# Patient Record
Sex: MALE | Race: WHITE | NOT HISPANIC OR LATINO
[De-identification: names, ages, dates, MRNs, and addresses within clinical notes are randomized per-mention and may not be internally consistent; named-entity substitution may affect disease eponyms.]

---

## 2021-03-04 PROBLEM — Z00.00 ENCOUNTER FOR PREVENTIVE HEALTH EXAMINATION: Status: ACTIVE | Noted: 2021-03-04

## 2021-03-19 ENCOUNTER — APPOINTMENT (OUTPATIENT)
Dept: HEART AND VASCULAR | Facility: CLINIC | Age: 76
End: 2021-03-19
Payer: MEDICARE

## 2021-03-19 VITALS
HEART RATE: 56 BPM | HEIGHT: 76 IN | SYSTOLIC BLOOD PRESSURE: 223 MMHG | TEMPERATURE: 97.34 F | WEIGHT: 270 LBS | DIASTOLIC BLOOD PRESSURE: 101 MMHG | BODY MASS INDEX: 32.88 KG/M2 | OXYGEN SATURATION: 98 %

## 2021-03-19 VITALS
HEART RATE: 71 BPM | TEMPERATURE: 95.9 F | BODY MASS INDEX: 26.51 KG/M2 | HEIGHT: 73 IN | DIASTOLIC BLOOD PRESSURE: 124 MMHG | OXYGEN SATURATION: 97 % | WEIGHT: 200 LBS | SYSTOLIC BLOOD PRESSURE: 178 MMHG

## 2021-03-19 DIAGNOSIS — N52.9 MALE ERECTILE DYSFUNCTION, UNSPECIFIED: ICD-10-CM

## 2021-03-19 PROCEDURE — 99204 OFFICE O/P NEW MOD 45 MIN: CPT | Mod: 25

## 2021-03-19 PROCEDURE — 93000 ELECTROCARDIOGRAM COMPLETE: CPT

## 2021-04-19 PROBLEM — N52.9 ERECTILE DYSFUNCTION: Status: ACTIVE | Noted: 2021-04-19

## 2021-04-19 RX ORDER — SILDENAFIL 25 MG/1
25 TABLET ORAL
Qty: 10 | Refills: 0 | Status: ACTIVE | COMMUNITY
Start: 2021-04-19

## 2021-04-19 RX ORDER — RIVAROXABAN 20 MG/1
20 TABLET, FILM COATED ORAL
Qty: 90 | Refills: 0 | Status: ACTIVE | COMMUNITY
Start: 2021-04-19

## 2021-04-19 NOTE — CARDIOLOGY SUMMARY
[LVEF ___%] : LVEF [unfilled]% [___] : [unfilled] [Enlarged] : enlarged LA size [Moderate] : moderate mitral regurgitation

## 2021-04-19 NOTE — DISCUSSION/SUMMARY
[FreeTextEntry1] : Mr. Velásquez is a 75 year-old male with persistent atrial fibrillation with symptoms despite adequate rate control.  To determine if his symptoms are related to his AF, I have asked him to undergo a DC cardioversion and evaluate his WEAVER in normal rhythm.  If his symptoms improve, we will consider AAD or ablative therapy.  In the interim, he will remain on Toprol XL for rate control and Xarelto for stroke prophylaxis. He will be scheduled in the coming weeks.

## 2021-04-19 NOTE — HISTORY OF PRESENT ILLNESS
[FreeTextEntry1] : Mr. Velásquez is a 75 year-old male with hypertension, hyperlipidemia, and persistent atrial fibrillation who presents to [facility] care.  He was diagnosed with atrial fibrillation and has been managed with a rate control strategy employing low dose toprol.  Despite adequate rate control, he continues to reports dyspnea upon exertion, reporting SOB after 1/4 mile or a flight of stairs.  He denies palpitations, chest pain, SOB at rest, LE edema, orthopnea, PND, and syncope.  No previous attempts at rhythm control.  He has been using Xarelto for stroke prophylaxis and denies any bleeding issues.

## 2021-04-19 NOTE — PHYSICAL EXAM
[General Appearance - Well Developed] : well developed [Normal Appearance] : normal appearance [Well Groomed] : well groomed [General Appearance - Well Nourished] : well nourished [No Deformities] : no deformities [General Appearance - In No Acute Distress] : no acute distress [Normal Conjunctiva] : the conjunctiva exhibited no abnormalities [Eyelids - No Xanthelasma] : the eyelids demonstrated no xanthelasmas [Normal Oral Mucosa] : normal oral mucosa [No Oral Pallor] : no oral pallor [No Oral Cyanosis] : no oral cyanosis [Normal Jugular Venous A Waves Present] : normal jugular venous A waves present [Normal Jugular Venous V Waves Present] : normal jugular venous V waves present [No Jugular Venous Johnson A Waves] : no jugular venous johnson A waves [Heart Rate And Rhythm] : heart rate and rhythm were normal [Heart Sounds] : normal S1 and S2 [Murmurs] : no murmurs present [Respiration, Rhythm And Depth] : normal respiratory rhythm and effort [Exaggerated Use Of Accessory Muscles For Inspiration] : no accessory muscle use [Auscultation Breath Sounds / Voice Sounds] : lungs were clear to auscultation bilaterally [Abdomen Soft] : soft [Abdomen Tenderness] : non-tender [Abdomen Mass (___ Cm)] : no abdominal mass palpated [Abnormal Walk] : normal gait [Gait - Sufficient For Exercise Testing] : the gait was sufficient for exercise testing [Nail Clubbing] : no clubbing of the fingernails [Cyanosis, Localized] : no localized cyanosis [Petechial Hemorrhages (___cm)] : no petechial hemorrhages [] : no ischemic changes

## 2021-04-23 ENCOUNTER — APPOINTMENT (OUTPATIENT)
Dept: HEART AND VASCULAR | Facility: CLINIC | Age: 76
End: 2021-04-23

## 2021-04-23 ENCOUNTER — APPOINTMENT (OUTPATIENT)
Dept: HEART AND VASCULAR | Facility: CLINIC | Age: 76
End: 2021-04-23
Payer: MEDICARE

## 2021-04-23 VITALS
BODY MASS INDEX: 26.11 KG/M2 | TEMPERATURE: 96.98 F | SYSTOLIC BLOOD PRESSURE: 124 MMHG | HEART RATE: 88 BPM | WEIGHT: 197 LBS | OXYGEN SATURATION: 96 % | HEIGHT: 73 IN | DIASTOLIC BLOOD PRESSURE: 79 MMHG

## 2021-04-23 PROCEDURE — 99214 OFFICE O/P EST MOD 30 MIN: CPT

## 2021-04-30 ENCOUNTER — TRANSCRIPTION ENCOUNTER (OUTPATIENT)
Age: 76
End: 2021-04-30

## 2021-05-11 NOTE — PHYSICAL EXAM
[General Appearance - Well Developed] : well developed [Normal Appearance] : normal appearance [Well Groomed] : well groomed [General Appearance - Well Nourished] : well nourished [No Deformities] : no deformities [General Appearance - In No Acute Distress] : no acute distress [Normal Conjunctiva] : the conjunctiva exhibited no abnormalities [Eyelids - No Xanthelasma] : the eyelids demonstrated no xanthelasmas [Normal Oral Mucosa] : normal oral mucosa [No Oral Pallor] : no oral pallor [No Oral Cyanosis] : no oral cyanosis [Normal Jugular Venous A Waves Present] : normal jugular venous A waves present [Normal Jugular Venous V Waves Present] : normal jugular venous V waves present [No Jugular Venous Johnson A Waves] : no jugular venous johnson A waves [Heart Rate And Rhythm] : heart rate and rhythm were normal [Heart Sounds] : normal S1 and S2 [Murmurs] : no murmurs present [Respiration, Rhythm And Depth] : normal respiratory rhythm and effort [Exaggerated Use Of Accessory Muscles For Inspiration] : no accessory muscle use [Auscultation Breath Sounds / Voice Sounds] : lungs were clear to auscultation bilaterally [Abdomen Soft] : soft [Abdomen Tenderness] : non-tender [Abdomen Mass (___ Cm)] : no abdominal mass palpated [Gait - Sufficient For Exercise Testing] : the gait was sufficient for exercise testing [Abnormal Walk] : normal gait [Nail Clubbing] : no clubbing of the fingernails [Cyanosis, Localized] : no localized cyanosis [Petechial Hemorrhages (___cm)] : no petechial hemorrhages [] : no ischemic changes

## 2021-05-16 NOTE — DISCUSSION/SUMMARY
[FreeTextEntry1] : Mr. Velásquez is a 75 year-old male with persistent atrial fibrillation with symptoms despite adequate rate control.  To determine if his symptoms are related to his AF, I have asked him to undergo a DC cardioversion and evaluate his WEAVER in normal rhythm.  If his symptoms improve, we will consider AAD or ablative therapy.  In the interim, he will remain on Toprol XL for rate control and Xarelto for stroke prophylaxis. He will be scheduled several weeks after his upcoming urologic procedure.

## 2021-05-16 NOTE — HISTORY OF PRESENT ILLNESS
[FreeTextEntry1] : Mr. Velásquez is a 75 year-old male with hypertension, hyperlipidemia, and persistent atrial fibrillation who presents to [facility] care.  He was diagnosed with atrial fibrillation and has been managed with a rate control strategy employing low dose toprol.  Despite adequate rate control, he continues to reports dyspnea upon exertion, reporting SOB after 1/4 mile or a flight of stairs.  He denies palpitations, chest pain, SOB at rest, LE edema, orthopnea, PND, and syncope.  No previous attempts at rhythm control.  He has been using Xarelto for stroke prophylaxis and denies any bleeding issues.   \par \par We were planning on proceeding with DC cardioversion, but he now requires emergent prostate surgery due to urinary retention.

## 2021-06-18 ENCOUNTER — APPOINTMENT (OUTPATIENT)
Dept: HEART AND VASCULAR | Facility: CLINIC | Age: 76
End: 2021-06-18
Payer: MEDICARE

## 2021-06-18 ENCOUNTER — NON-APPOINTMENT (OUTPATIENT)
Age: 76
End: 2021-06-18

## 2021-06-18 VITALS
OXYGEN SATURATION: 97 % | HEIGHT: 73 IN | TEMPERATURE: 98 F | WEIGHT: 195 LBS | SYSTOLIC BLOOD PRESSURE: 180 MMHG | DIASTOLIC BLOOD PRESSURE: 100 MMHG | BODY MASS INDEX: 25.84 KG/M2 | HEART RATE: 57 BPM

## 2021-06-18 PROCEDURE — 99214 OFFICE O/P EST MOD 30 MIN: CPT | Mod: 25

## 2021-06-18 PROCEDURE — 93000 ELECTROCARDIOGRAM COMPLETE: CPT

## 2021-07-06 NOTE — DISCUSSION/SUMMARY
[FreeTextEntry1] : Mr. Velásquez is a 76 year-old male with persistent atrial fibrillation with symptoms despite adequate rate control.  His WEAVER improved following DC cardioversion.  As such, I have asked him to consider AAD or ablative therapy.  He will consider these options and contact me with questions.  In the interim, he will remain on Toprol XL for rate control and Xarelto for stroke prophylaxis.

## 2021-07-06 NOTE — HISTORY OF PRESENT ILLNESS
[FreeTextEntry1] : Mr. Velásquez is a 76 year-old male with hypertension, hyperlipidemia, and persistent atrial fibrillation who presents for follow-up.  He was diagnosed with atrial fibrillation and had been managed with a rate control strategy employing low dose toprol.  Despite adequate rate control, he continued to reports dyspnea upon exertion, reporting SOB after 1/4 mile or a flight of stairs.  He denied palpitations, chest pain, SOB at rest, LE edema, orthopnea, PND, and syncope.  Due to persistent symptoms despite adequate rate control, he underwent DC cardioversion in June 2021.  He a dramatic improvement in his symptoms since the cardioversion, with much less WEAVER.  He has been taking his Xarelto without issue.

## 2021-07-06 NOTE — CARDIOLOGY SUMMARY
[de-identified] : NSR at 61 bpm, normal axis/intervals, PAC [LVEF ___%] : LVEF [unfilled]% [___] : [unfilled] [Enlarged] : enlarged LA size [Moderate] : moderate mitral regurgitation

## 2021-07-06 NOTE — PHYSICAL EXAM
[General Appearance - Well Developed] : well developed [Normal Appearance] : normal appearance [Well Groomed] : well groomed [General Appearance - Well Nourished] : well nourished [No Deformities] : no deformities [General Appearance - In No Acute Distress] : no acute distress [Normal Conjunctiva] : the conjunctiva exhibited no abnormalities [Eyelids - No Xanthelasma] : the eyelids demonstrated no xanthelasmas [Normal Oral Mucosa] : normal oral mucosa [No Oral Pallor] : no oral pallor [No Oral Cyanosis] : no oral cyanosis [Normal Jugular Venous A Waves Present] : normal jugular venous A waves present [Normal Jugular Venous V Waves Present] : normal jugular venous V waves present [No Jugular Venous Johnson A Waves] : no jugular venous johnson A waves [Heart Rate And Rhythm] : heart rate and rhythm were normal [Heart Sounds] : normal S1 and S2 [Murmurs] : no murmurs present [Respiration, Rhythm And Depth] : normal respiratory rhythm and effort [Exaggerated Use Of Accessory Muscles For Inspiration] : no accessory muscle use [Auscultation Breath Sounds / Voice Sounds] : lungs were clear to auscultation bilaterally [Abdomen Soft] : soft [Abdomen Tenderness] : non-tender [Abnormal Walk] : normal gait [Abdomen Mass (___ Cm)] : no abdominal mass palpated [Gait - Sufficient For Exercise Testing] : the gait was sufficient for exercise testing [Nail Clubbing] : no clubbing of the fingernails [Cyanosis, Localized] : no localized cyanosis [Petechial Hemorrhages (___cm)] : no petechial hemorrhages [] : no ischemic changes

## 2021-08-02 ENCOUNTER — NON-APPOINTMENT (OUTPATIENT)
Age: 76
End: 2021-08-02

## 2021-08-03 ENCOUNTER — APPOINTMENT (OUTPATIENT)
Dept: SURGERY | Facility: CLINIC | Age: 76
End: 2021-08-03
Payer: MEDICARE

## 2021-08-03 ENCOUNTER — NON-APPOINTMENT (OUTPATIENT)
Age: 76
End: 2021-08-03

## 2021-08-03 DIAGNOSIS — K40.90 UNILATERAL INGUINAL HERNIA, W/OUT OBSTRUCTION OR GANGRENE, NOT SPECIFIED AS RECURRENT: ICD-10-CM

## 2021-08-03 PROCEDURE — 99204 OFFICE O/P NEW MOD 45 MIN: CPT

## 2021-08-03 NOTE — PHYSICAL EXAM
[Normal Breath Sounds] : Normal breath sounds [Normal Heart Sounds] : normal heart sounds [No Rash or Lesion] : No rash or lesion [Alert] : alert [Oriented to Person] : oriented to person [Oriented to Place] : oriented to place [Oriented to Time] : oriented to time [Calm] : calm [de-identified] : NAD [de-identified] : right inguinal hernia, reducible, no left inguinal hernia palpated

## 2021-08-03 NOTE — HISTORY OF PRESENT ILLNESS
[de-identified] : The patient presents for evaluation of a new finding of right groin pain and intermittent bulging. He first noted this a few weeks ago and it is becoming more noticeable and uncomfortable. He feels it as the day progresses. He has no \par GI issues but is s/p a recent TURP. He is on Xarelto for a fib.

## 2021-08-03 NOTE — PLAN
[FreeTextEntry1] : He will see Dr De Paz this week for medical clearance and will need to come off the Xarelto 3 days prior to the surgery (his cardiologist is Dr Caballero). \par He will tentatively schedule the surgery for Monday August 16th. He will need Covid testing prior to the surgery.

## 2021-08-03 NOTE — CONSULT LETTER
[Dear  ___] : Dear  [unfilled], [Consult Letter:] : I had the pleasure of evaluating your patient, [unfilled]. [Please see my note below.] : Please see my note below. [FreeTextEntry1] : Hakan Vargas\par \tamar Mario Didier is setting up his hernia repair for August 16th (robotic right, possible left). He is seeing you for medical clearance. He asked about an anxiolytic the night before due to his anxiety. I told him you would be the one to talk to about this. He also should come off his Xarelto (which he did prior to his TURP) without the need for bridging. Thanks!\tamar \tamar Angulo

## 2021-08-03 NOTE — ASSESSMENT
[FreeTextEntry1] : right inguinla hernia, reducible, symptomatic,recommended a  robotic assisted laparoscopic RIHR with mesh, possible left, possible open\par The risks benefits and alternatives were discussed and the patient agrees to the described plan.\par \par

## 2021-08-16 ENCOUNTER — APPOINTMENT (OUTPATIENT)
Dept: SURGERY | Facility: HOSPITAL | Age: 76
End: 2021-08-16

## 2021-09-07 ENCOUNTER — APPOINTMENT (OUTPATIENT)
Dept: SURGERY | Facility: CLINIC | Age: 76
End: 2021-09-07
Payer: MEDICARE

## 2021-09-07 VITALS
WEIGHT: 192 LBS | BODY MASS INDEX: 25.45 KG/M2 | HEIGHT: 73 IN | HEART RATE: 56 BPM | SYSTOLIC BLOOD PRESSURE: 136 MMHG | TEMPERATURE: 97.7 F | DIASTOLIC BLOOD PRESSURE: 81 MMHG

## 2021-09-07 DIAGNOSIS — Z09 ENCOUNTER FOR FOLLOW-UP EXAMINATION AFTER COMPLETED TREATMENT FOR CONDITIONS OTHER THAN MALIGNANT NEOPLASM: ICD-10-CM

## 2021-09-07 PROCEDURE — 99024 POSTOP FOLLOW-UP VISIT: CPT

## 2021-09-17 ENCOUNTER — NON-APPOINTMENT (OUTPATIENT)
Age: 76
End: 2021-09-17

## 2021-09-17 ENCOUNTER — APPOINTMENT (OUTPATIENT)
Dept: HEART AND VASCULAR | Facility: CLINIC | Age: 76
End: 2021-09-17
Payer: MEDICARE

## 2021-09-17 VITALS
WEIGHT: 190 LBS | SYSTOLIC BLOOD PRESSURE: 170 MMHG | HEART RATE: 55 BPM | OXYGEN SATURATION: 98 % | TEMPERATURE: 97.8 F | BODY MASS INDEX: 25.18 KG/M2 | DIASTOLIC BLOOD PRESSURE: 100 MMHG | HEIGHT: 73 IN

## 2021-09-17 VITALS
WEIGHT: 185 LBS | TEMPERATURE: 97.8 F | DIASTOLIC BLOOD PRESSURE: 80 MMHG | HEART RATE: 62 BPM | OXYGEN SATURATION: 98 % | SYSTOLIC BLOOD PRESSURE: 115 MMHG | BODY MASS INDEX: 24.52 KG/M2 | HEIGHT: 73 IN

## 2021-09-17 PROCEDURE — 99214 OFFICE O/P EST MOD 30 MIN: CPT | Mod: 25

## 2021-09-17 PROCEDURE — 93000 ELECTROCARDIOGRAM COMPLETE: CPT

## 2021-09-17 RX ORDER — FUROSEMIDE 20 MG/1
20 TABLET ORAL DAILY
Qty: 30 | Refills: 0 | Status: DISCONTINUED | COMMUNITY
Start: 2021-04-19 | End: 2021-09-17

## 2021-09-17 RX ORDER — ALFUZOSIN HYDROCHLORIDE 10 MG/1
10 TABLET, EXTENDED RELEASE ORAL DAILY
Qty: 30 | Refills: 0 | Status: DISCONTINUED | COMMUNITY
Start: 2021-04-19 | End: 2021-09-17

## 2021-09-17 RX ORDER — SIMVASTATIN 20 MG/1
20 TABLET, FILM COATED ORAL
Qty: 30 | Refills: 0 | Status: DISCONTINUED | COMMUNITY
Start: 2021-04-19 | End: 2021-09-17

## 2021-09-17 RX ORDER — METOPROLOL SUCCINATE 25 MG/1
25 TABLET, EXTENDED RELEASE ORAL
Qty: 30 | Refills: 2 | Status: DISCONTINUED | COMMUNITY
Start: 2021-04-19 | End: 2021-09-17

## 2021-09-19 NOTE — PHYSICAL EXAM
[General Appearance - Well Developed] : well developed [Well Groomed] : well groomed [Normal Appearance] : normal appearance [General Appearance - Well Nourished] : well nourished [No Deformities] : no deformities [General Appearance - In No Acute Distress] : no acute distress [Normal Conjunctiva] : the conjunctiva exhibited no abnormalities [Eyelids - No Xanthelasma] : the eyelids demonstrated no xanthelasmas [Normal Oral Mucosa] : normal oral mucosa [No Oral Pallor] : no oral pallor [No Oral Cyanosis] : no oral cyanosis [Normal Jugular Venous A Waves Present] : normal jugular venous A waves present [Normal Jugular Venous V Waves Present] : normal jugular venous V waves present [No Jugular Venous Johnson A Waves] : no jugular venous johnson A waves [Heart Rate And Rhythm] : heart rate and rhythm were normal [Heart Sounds] : normal S1 and S2 [Murmurs] : no murmurs present [Respiration, Rhythm And Depth] : normal respiratory rhythm and effort [Exaggerated Use Of Accessory Muscles For Inspiration] : no accessory muscle use [Auscultation Breath Sounds / Voice Sounds] : lungs were clear to auscultation bilaterally [Abdomen Tenderness] : non-tender [Abdomen Soft] : soft [Abdomen Mass (___ Cm)] : no abdominal mass palpated [Abnormal Walk] : normal gait [Gait - Sufficient For Exercise Testing] : the gait was sufficient for exercise testing [Nail Clubbing] : no clubbing of the fingernails [Cyanosis, Localized] : no localized cyanosis [Petechial Hemorrhages (___cm)] : no petechial hemorrhages [] : no ischemic changes

## 2021-09-19 NOTE — CARDIOLOGY SUMMARY
[de-identified] : NSR at 62 bpm, normal axis/intervals, PAC [LVEF ___%] : LVEF [unfilled]% [___] : [unfilled] [Enlarged] : enlarged LA size [Moderate] : moderate mitral regurgitation

## 2021-12-17 ENCOUNTER — APPOINTMENT (OUTPATIENT)
Dept: HEART AND VASCULAR | Facility: CLINIC | Age: 76
End: 2021-12-17
Payer: MEDICARE

## 2021-12-17 ENCOUNTER — NON-APPOINTMENT (OUTPATIENT)
Age: 76
End: 2021-12-17

## 2021-12-17 VITALS
HEIGHT: 73 IN | WEIGHT: 195 LBS | SYSTOLIC BLOOD PRESSURE: 110 MMHG | HEART RATE: 65 BPM | BODY MASS INDEX: 25.84 KG/M2 | TEMPERATURE: 97.8 F | DIASTOLIC BLOOD PRESSURE: 80 MMHG | OXYGEN SATURATION: 97 %

## 2021-12-17 PROCEDURE — 99214 OFFICE O/P EST MOD 30 MIN: CPT | Mod: 25

## 2021-12-17 PROCEDURE — 93000 ELECTROCARDIOGRAM COMPLETE: CPT

## 2022-01-03 NOTE — CARDIOLOGY SUMMARY
[de-identified] : NSR at 58 bpm, normal axis/intervals [LVEF ___%] : LVEF [unfilled]% [___] : [unfilled] [Enlarged] : enlarged LA size [Moderate] : moderate mitral regurgitation

## 2022-06-24 ENCOUNTER — NON-APPOINTMENT (OUTPATIENT)
Age: 77
End: 2022-06-24

## 2022-06-24 ENCOUNTER — APPOINTMENT (OUTPATIENT)
Dept: HEART AND VASCULAR | Facility: CLINIC | Age: 77
End: 2022-06-24

## 2022-06-24 VITALS
DIASTOLIC BLOOD PRESSURE: 70 MMHG | HEART RATE: 77 BPM | OXYGEN SATURATION: 97 % | HEIGHT: 73 IN | TEMPERATURE: 97.8 F | SYSTOLIC BLOOD PRESSURE: 110 MMHG | WEIGHT: 199 LBS | BODY MASS INDEX: 26.37 KG/M2

## 2022-06-24 PROCEDURE — 99213 OFFICE O/P EST LOW 20 MIN: CPT | Mod: 25

## 2022-06-24 PROCEDURE — 93000 ELECTROCARDIOGRAM COMPLETE: CPT

## 2022-07-06 NOTE — DISCUSSION/SUMMARY
[FreeTextEntry1] : Mr. Velásquez is a 77 year-old male with persistent atrial fibrillation with symptoms despite adequate rate control.  His WEAVER improved following DC cardioversion.  As such, I have asked him to consider AAD or ablative therapy.  He will consider these options and contact me with questions, but would like to wait until he has a recurrence.  In the interim, he will remain on Toprol XL for rate control and Xarelto for stroke prophylaxis.

## 2022-07-06 NOTE — HISTORY OF PRESENT ILLNESS
[FreeTextEntry1] : Mr. Velásquez is a 77 year-old male with hypertension, hyperlipidemia, and persistent atrial fibrillation who presents for follow-up.  He was diagnosed with atrial fibrillation and had been managed with a rate control strategy employing low dose toprol.  Despite adequate rate control, he continued to reports dyspnea upon exertion, reporting SOB after 1/4 mile or a flight of stairs.  He denied palpitations, chest pain, SOB at rest, LE edema, orthopnea, PND, and syncope.  Due to persistent symptoms despite adequate rate control, he underwent DC cardioversion in June 2021.  He a dramatic improvement in his symptoms since the cardioversion, with much less WEAVER.  He has been taking his Xarelto without issue.

## 2022-07-06 NOTE — CARDIOLOGY SUMMARY
[de-identified] : NSR at 57 bpm, normal axis/intervals [LVEF ___%] : LVEF [unfilled]% [___] : [unfilled] [Enlarged] : enlarged LA size [Moderate] : moderate mitral regurgitation

## 2022-09-01 ENCOUNTER — NON-APPOINTMENT (OUTPATIENT)
Age: 77
End: 2022-09-01

## 2022-09-16 ENCOUNTER — APPOINTMENT (OUTPATIENT)
Dept: HEART AND VASCULAR | Facility: CLINIC | Age: 77
End: 2022-09-16

## 2022-09-16 ENCOUNTER — NON-APPOINTMENT (OUTPATIENT)
Age: 77
End: 2022-09-16

## 2022-09-16 VITALS
DIASTOLIC BLOOD PRESSURE: 90 MMHG | SYSTOLIC BLOOD PRESSURE: 135 MMHG | HEIGHT: 73 IN | TEMPERATURE: 98.7 F | HEART RATE: 77 BPM | WEIGHT: 203 LBS | BODY MASS INDEX: 26.9 KG/M2 | OXYGEN SATURATION: 98 %

## 2022-09-16 PROCEDURE — 93000 ELECTROCARDIOGRAM COMPLETE: CPT

## 2022-09-16 PROCEDURE — 99214 OFFICE O/P EST MOD 30 MIN: CPT | Mod: 25

## 2022-09-17 ENCOUNTER — RESULT REVIEW (OUTPATIENT)
Age: 77
End: 2022-09-17

## 2022-10-06 NOTE — CARDIOLOGY SUMMARY
[de-identified] : AF at 79 bpm, normal axis/intervals [LVEF ___%] : LVEF [unfilled]% [___] : [unfilled] [Enlarged] : enlarged LA size [Moderate] : moderate mitral regurgitation

## 2022-10-06 NOTE — HISTORY OF PRESENT ILLNESS
[FreeTextEntry1] : Mr. Velásquez is a 77 year-old male with hypertension, hyperlipidemia, and persistent atrial fibrillation who presents for follow-up.  He was diagnosed with atrial fibrillation and had been managed with a rate control strategy employing low dose toprol.  Despite adequate rate control, he continued to reports dyspnea upon exertion, reporting SOB after 1/4 mile or a flight of stairs.  He denied palpitations, chest pain, SOB at rest, LE edema, orthopnea, PND, and syncope.  Due to persistent symptoms despite adequate rate control, he underwent DC cardioversion in June 2021.  He reported a dramatic improvement in his symptoms since the cardioversion, with much less WEAVER.  He unfortunately reverted back to AF in August with similar complaints of WEAVER.

## 2022-10-06 NOTE — PHYSICAL EXAM
[Well Developed] : well developed [Well Nourished] : well nourished [No Acute Distress] : no acute distress [Normal Venous Pressure] : normal venous pressure [No Carotid Bruit] : no carotid bruit [Normal S1, S2] : normal S1, S2 [No Murmur] : no murmur [No Rub] : no rub [No Gallop] : no gallop [Clear Lung Fields] : clear lung fields [Good Air Entry] : good air entry [No Respiratory Distress] : no respiratory distress  [Soft] : abdomen soft [Non Tender] : non-tender [No Masses/organomegaly] : no masses/organomegaly [Normal Bowel Sounds] : normal bowel sounds [Normal Gait] : normal gait [No Edema] : no edema [No Cyanosis] : no cyanosis [No Clubbing] : no clubbing [No Varicosities] : no varicosities [No Rash] : no rash [No Skin Lesions] : no skin lesions [Moves all extremities] : moves all extremities [No Focal Deficits] : no focal deficits [Normal Speech] : normal speech [Alert and Oriented] : alert and oriented [Normal memory] : normal memory [General Appearance - Well Developed] : well developed [Normal Appearance] : normal appearance [Well Groomed] : well groomed [General Appearance - Well Nourished] : well nourished [No Deformities] : no deformities [General Appearance - In No Acute Distress] : no acute distress [Normal Conjunctiva] : the conjunctiva exhibited no abnormalities [Eyelids - No Xanthelasma] : the eyelids demonstrated no xanthelasmas [Normal Oral Mucosa] : normal oral mucosa [No Oral Pallor] : no oral pallor [No Oral Cyanosis] : no oral cyanosis [Normal Jugular Venous A Waves Present] : normal jugular venous A waves present [Normal Jugular Venous V Waves Present] : normal jugular venous V waves present [No Jugular Venous Johnson A Waves] : no jugular venous johnson A waves [Heart Rate And Rhythm] : heart rate and rhythm were normal [Heart Sounds] : normal S1 and S2 [Murmurs] : no murmurs present [Respiration, Rhythm And Depth] : normal respiratory rhythm and effort [Exaggerated Use Of Accessory Muscles For Inspiration] : no accessory muscle use [Auscultation Breath Sounds / Voice Sounds] : lungs were clear to auscultation bilaterally [Abdomen Soft] : soft [Abdomen Tenderness] : non-tender [Abdomen Mass (___ Cm)] : no abdominal mass palpated [Abnormal Walk] : normal gait [Gait - Sufficient For Exercise Testing] : the gait was sufficient for exercise testing [Nail Clubbing] : no clubbing of the fingernails [Cyanosis, Localized] : no localized cyanosis [Petechial Hemorrhages (___cm)] : no petechial hemorrhages [] : no ischemic changes

## 2022-10-06 NOTE — DISCUSSION/SUMMARY
[FreeTextEntry1] : Mr. Velásquez is a 77 year-old male with persistent atrial fibrillation with symptoms despite adequate rate control.  His WEAVER improved following DC cardioversion but he has now reverted back to AF.   As such, I have asked him to consider DC cardioversion followed by ablation.  We discussed the risks of the procedure, which included but are not limited to, bleeding, infection, vascular damage, tamponade, damaged to esophagus/GI tract, phrenic nerve injury,  and stroke.  He would like to proceed and will be scheduled for DCCV in the coming week, followed by ablation in October of 2022.  He will maintain Xarelto without interruption.   [EKG obtained to assist in diagnosis and management of assessed problem(s)] : EKG obtained to assist in diagnosis and management of assessed problem(s)

## 2022-10-29 ENCOUNTER — RESULT REVIEW (OUTPATIENT)
Age: 77
End: 2022-10-29

## 2022-11-02 ENCOUNTER — OUTPATIENT (OUTPATIENT)
Dept: OUTPATIENT SERVICES | Facility: HOSPITAL | Age: 77
LOS: 1 days | End: 2022-11-02

## 2022-11-02 ENCOUNTER — INPATIENT (INPATIENT)
Facility: HOSPITAL | Age: 77
LOS: 0 days | Discharge: HOME | DRG: 274 | End: 2022-11-03
Attending: INTERNAL MEDICINE | Admitting: INTERNAL MEDICINE
Payer: COMMERCIAL

## 2022-11-02 ENCOUNTER — APPOINTMENT (OUTPATIENT)
Dept: CT IMAGING | Facility: HOSPITAL | Age: 77
End: 2022-11-02

## 2022-11-02 VITALS
HEIGHT: 73 IN | OXYGEN SATURATION: 93 % | DIASTOLIC BLOOD PRESSURE: 80 MMHG | HEART RATE: 69 BPM | RESPIRATION RATE: 16 BRPM | WEIGHT: 205.03 LBS | SYSTOLIC BLOOD PRESSURE: 123 MMHG

## 2022-11-02 DIAGNOSIS — I48.19 OTHER PERSISTENT ATRIAL FIBRILLATION: ICD-10-CM

## 2022-11-02 LAB
ALBUMIN SERPL ELPH-MCNC: 4.2 G/DL — SIGNIFICANT CHANGE UP (ref 3.3–5)
ALP SERPL-CCNC: 66 U/L — SIGNIFICANT CHANGE UP (ref 40–120)
ALT FLD-CCNC: 18 U/L — SIGNIFICANT CHANGE UP (ref 10–45)
ANION GAP SERPL CALC-SCNC: 8 MMOL/L — SIGNIFICANT CHANGE UP (ref 5–17)
APTT BLD: 41.2 SEC — HIGH (ref 27.5–35.5)
AST SERPL-CCNC: 21 U/L — SIGNIFICANT CHANGE UP (ref 10–40)
BASE EXCESS BLDV CALC-SCNC: 5.6 MMOL/L — HIGH (ref -2–3)
BILIRUB SERPL-MCNC: 1.2 MG/DL — SIGNIFICANT CHANGE UP (ref 0.2–1.2)
BLD GP AB SCN SERPL QL: NEGATIVE — SIGNIFICANT CHANGE UP
BUN SERPL-MCNC: 24 MG/DL — HIGH (ref 7–23)
CA-I SERPL-SCNC: 1.16 MMOL/L — SIGNIFICANT CHANGE UP (ref 1.15–1.33)
CALCIUM SERPL-MCNC: 9 MG/DL — SIGNIFICANT CHANGE UP (ref 8.4–10.5)
CHLORIDE SERPL-SCNC: 98 MMOL/L — SIGNIFICANT CHANGE UP (ref 96–108)
CO2 BLDV-SCNC: 34.2 MMOL/L — HIGH (ref 22–26)
CO2 SERPL-SCNC: 31 MMOL/L — SIGNIFICANT CHANGE UP (ref 22–31)
COHGB MFR BLDV: 0.8 % — SIGNIFICANT CHANGE UP
CREAT SERPL-MCNC: 1.06 MG/DL — SIGNIFICANT CHANGE UP (ref 0.5–1.3)
EGFR: 73 ML/MIN/1.73M2 — SIGNIFICANT CHANGE UP
GAS PNL BLDV: 136 MMOL/L — SIGNIFICANT CHANGE UP (ref 136–145)
GLUCOSE BLDC GLUCOMTR-MCNC: 204 MG/DL — HIGH (ref 70–99)
GLUCOSE BLDV-MCNC: 258 MG/DL — HIGH (ref 70–99)
GLUCOSE SERPL-MCNC: 258 MG/DL — HIGH (ref 70–99)
HCO3 BLDV-SCNC: 32 MMOL/L — HIGH (ref 22–29)
HCT VFR BLD CALC: 43.5 % — SIGNIFICANT CHANGE UP (ref 39–50)
HGB BLD CALC-MCNC: 14.9 G/DL — SIGNIFICANT CHANGE UP (ref 12.6–17.4)
HGB BLD-MCNC: 14.9 G/DL — SIGNIFICANT CHANGE UP (ref 13–17)
INR BLD: 2.01 — HIGH (ref 0.88–1.16)
ISTAT INR: 2.1 — HIGH (ref 0.88–1.16)
ISTAT PT: 23.8 SEC — HIGH (ref 10–12.9)
MCHC RBC-ENTMCNC: 31 PG — SIGNIFICANT CHANGE UP (ref 27–34)
MCHC RBC-ENTMCNC: 34.3 GM/DL — SIGNIFICANT CHANGE UP (ref 32–36)
MCV RBC AUTO: 90.4 FL — SIGNIFICANT CHANGE UP (ref 80–100)
METHGB MFR BLDV: 0.1 % — SIGNIFICANT CHANGE UP
NRBC # BLD: 0 /100 WBCS — SIGNIFICANT CHANGE UP (ref 0–0)
PCO2 BLDV: 55 MMHG — SIGNIFICANT CHANGE UP (ref 42–55)
PH BLDV: 7.38 — SIGNIFICANT CHANGE UP (ref 7.32–7.43)
PLATELET # BLD AUTO: 141 K/UL — LOW (ref 150–400)
PO2 BLDV: <33 MMHG — LOW (ref 25–45)
POCT ISTAT CREATININE: 1 MG/DL — SIGNIFICANT CHANGE UP (ref 0.5–1.3)
POCT ISTAT CREATININE: 1.1 MG/DL — SIGNIFICANT CHANGE UP (ref 0.5–1.3)
POTASSIUM BLDV-SCNC: 4.2 MMOL/L — SIGNIFICANT CHANGE UP (ref 3.5–5.1)
POTASSIUM SERPL-MCNC: 4 MMOL/L — SIGNIFICANT CHANGE UP (ref 3.5–5.3)
POTASSIUM SERPL-SCNC: 4 MMOL/L — SIGNIFICANT CHANGE UP (ref 3.5–5.3)
PROT SERPL-MCNC: 7.1 G/DL — SIGNIFICANT CHANGE UP (ref 6–8.3)
PROTHROM AB SERPL-ACNC: 24.1 SEC — HIGH (ref 10.5–13.4)
RBC # BLD: 4.81 M/UL — SIGNIFICANT CHANGE UP (ref 4.2–5.8)
RBC # FLD: 12.7 % — SIGNIFICANT CHANGE UP (ref 10.3–14.5)
RH IG SCN BLD-IMP: POSITIVE — SIGNIFICANT CHANGE UP
SAO2 % BLDV: 32 % — LOW (ref 67–88)
SODIUM SERPL-SCNC: 137 MMOL/L — SIGNIFICANT CHANGE UP (ref 135–145)
WBC # BLD: 5 K/UL — SIGNIFICANT CHANGE UP (ref 3.8–10.5)
WBC # FLD AUTO: 5 K/UL — SIGNIFICANT CHANGE UP (ref 3.8–10.5)

## 2022-11-02 PROCEDURE — 93656 COMPRE EP EVAL ABLTJ ATR FIB: CPT

## 2022-11-02 PROCEDURE — 93010 ELECTROCARDIOGRAM REPORT: CPT

## 2022-11-02 PROCEDURE — 75574 CT ANGIO HRT W/3D IMAGE: CPT | Mod: 26,MH

## 2022-11-02 RX ORDER — RIVAROXABAN 15 MG-20MG
1 KIT ORAL
Qty: 0 | Refills: 0 | DISCHARGE

## 2022-11-02 RX ORDER — AMLODIPINE BESYLATE 2.5 MG/1
10 TABLET ORAL DAILY
Refills: 0 | Status: DISCONTINUED | OUTPATIENT
Start: 2022-11-02 | End: 2022-11-03

## 2022-11-02 RX ORDER — SIMVASTATIN 20 MG/1
1 TABLET, FILM COATED ORAL
Qty: 0 | Refills: 0 | DISCHARGE

## 2022-11-02 RX ORDER — RIVAROXABAN 15 MG-20MG
20 KIT ORAL
Refills: 0 | Status: DISCONTINUED | OUTPATIENT
Start: 2022-11-02 | End: 2022-11-03

## 2022-11-02 RX ORDER — LISINOPRIL 2.5 MG/1
40 TABLET ORAL DAILY
Refills: 0 | Status: DISCONTINUED | OUTPATIENT
Start: 2022-11-02 | End: 2022-11-03

## 2022-11-02 RX ORDER — RAMIPRIL 5 MG
1 CAPSULE ORAL
Qty: 0 | Refills: 0 | DISCHARGE

## 2022-11-02 RX ORDER — SIMVASTATIN 20 MG/1
20 TABLET, FILM COATED ORAL AT BEDTIME
Refills: 0 | Status: DISCONTINUED | OUTPATIENT
Start: 2022-11-02 | End: 2022-11-03

## 2022-11-02 RX ORDER — AMLODIPINE BESYLATE 2.5 MG/1
1 TABLET ORAL
Qty: 0 | Refills: 0 | DISCHARGE

## 2022-11-02 RX ADMIN — RIVAROXABAN 20 MILLIGRAM(S): KIT at 22:26

## 2022-11-02 RX ADMIN — SIMVASTATIN 20 MILLIGRAM(S): 20 TABLET, FILM COATED ORAL at 22:26

## 2022-11-02 RX ADMIN — AMLODIPINE BESYLATE 10 MILLIGRAM(S): 2.5 TABLET ORAL at 19:12

## 2022-11-02 NOTE — H&P ADULT - NSHPLABSRESULTS_GEN_ALL_CORE
14.9   5.00  )-----------( 141      ( 02 Nov 2022 11:55 )             43.5     11-02    137  |  98  |  24<H>  ----------------------------<  258<H>  4.0   |  31  |  1.06    Ca    9.0      02 Nov 2022 11:55    TPro  7.1  /  Alb  4.2  /  TBili  1.2  /  DBili  x   /  AST  21  /  ALT  18  /  AlkPhos  66  11-02    PT/INR - ( 02 Nov 2022 11:55 )   PT: 24.1 sec;   INR: 2.01       PTT - ( 02 Nov 2022 11:55 )  PTT:41.2 sec

## 2022-11-02 NOTE — H&P ADULT - NSHPPHYSICALEXAM_GEN_ALL_CORE
Constitutional: No acute Distress  Psych: Normal affect, normal mood  Neuro: A/o x 3, No focal deficits  Neck: Supple, NO JVD  CVS: irregular S1 S2, No M/R/G  Pulmonary: CTAB, Breathing unlabored, No Rhonchi/Rales/Wheezing  GI: Soft, Non -tender, +BS x 4 quads  Skin: No rash, warm and dry, no erythematous areas   MSK: 5/5 strength, normal range of motion, no swollen or erythematous joints.

## 2022-11-02 NOTE — CHART NOTE - NSCHARTNOTEFT_GEN_A_CORE
EPS BRIEF OP NOTE    DEV MURCIA  [id number]    PROCEDURE:  - AF ablation via cryoablation    INDICATION:  - persistent AF    ELECTROPHYSIOLOGIST(S):  - Dr. Lee (Attending)  - Dr. Crouch (Fellow)    ANESTHESIOLOGY:  - General  - Local    FINDINGS:  - US guided access of the bilateral groins. 11Fr in RFV. 10Fr + 7Fr in LFV  - ICE catheter revealed no effusion at start or end of case  - successful isolation of all four pulmonary veins via cryoablation  - Vascade MVP used for hemostasis    COMPLICATIONS:  - none    RECOMMENDATIONS:  - admit for overnight monitoring  - resume anticoagulation tonight  - continue home meds    Please refer to the full report to follow in [facility] for a detailed description of this case.    --  Sammy Crouch MD  Electrophysiology PGY7

## 2022-11-02 NOTE — CHART NOTE - NSCHARTNOTESELECT_GEN_ALL_CORE
Brief Op Note/Event Note Rhomboid Transposition Flap Text: The defect edges were debeveled with a #15 scalpel blade.  Given the location of the defect and the proximity to free margins a rhomboid transposition flap was deemed most appropriate.  Using a sterile surgical marker, an appropriate rhomboid flap was drawn incorporating the defect.    The area thus outlined was incised deep to adipose tissue with a #15 scalpel blade.  The skin margins were undermined to an appropriate distance in all directions utilizing iris scissors.

## 2022-11-02 NOTE — H&P ADULT - HISTORY OF PRESENT ILLNESS
Mr. Velásquez is a 77 year-old male with hypertension, hyperlipidemia, and persistent atrial fibrillation s/p DCCVx2 who presents for PVI ablation.     The patient was diagnosed with atrial fibrillation and had been managed with a rate control strategy employing low dose toprol. Despite adequate rate control, he continued to reports dyspnea upon exertion, reporting SOB after 1/4 mile or a flight of stairs. He denied palpitations, chest pain, SOB at rest, LE edema, orthopnea, PND, and syncope. Due to persistent symptoms despite adequate rate control, he underwent DC cardioversion in June 2021. He reported a dramatic improvement in his symptoms since the cardioversion, with much less WEAVER. He unfortunately reverted back to AF in August with similar complaints of WEAVER. He underwent a second cardioversion with reversion to AF shortly after. He presents today for AF ablation.     He presents today in AF. He has not missed any doses of Xarelto in the past month.

## 2022-11-02 NOTE — PRE-ANESTHESIA EVALUATION ADULT - NSANTHPMHFT_GEN_ALL_CORE
PMHx A-Fib (had successful DC CV last year but eventually reverted back to AFib)            HTN, HLD, Borderline DM, BPH, diverticulosis    PSxHx Hernia Repair, Arthroscopy knee, TURP- all GA no complication

## 2022-11-02 NOTE — H&P ADULT - ASSESSMENT
Mr. Velásquez is a 77 year-old male with hypertension, hyperlipidemia, and persistent atrial fibrillation s/p DCCVx2 who presents for PVI ablation.

## 2022-11-02 NOTE — PATIENT PROFILE ADULT - FALL HARM RISK - UNIVERSAL INTERVENTIONS
Bed in lowest position, wheels locked, appropriate side rails in place/Call bell, personal items and telephone in reach/Instruct patient to call for assistance before getting out of bed or chair/Non-slip footwear when patient is out of bed/[place] to call system/Physically safe environment - no spills, clutter or unnecessary equipment/Purposeful Proactive Rounding/Room/bathroom lighting operational, light cord in reach

## 2022-11-03 ENCOUNTER — TRANSCRIPTION ENCOUNTER (OUTPATIENT)
Age: 77
End: 2022-11-03

## 2022-11-03 VITALS
DIASTOLIC BLOOD PRESSURE: 97 MMHG | OXYGEN SATURATION: 100 % | SYSTOLIC BLOOD PRESSURE: 152 MMHG | HEART RATE: 86 BPM | RESPIRATION RATE: 18 BRPM

## 2022-11-03 LAB
HCV AB S/CO SERPL IA: 0.05 S/CO — SIGNIFICANT CHANGE UP
HCV AB SERPL-IMP: SIGNIFICANT CHANGE UP

## 2022-11-03 PROCEDURE — 93623 PRGRMD STIMJ&PACG IV RX NFS: CPT

## 2022-11-03 PROCEDURE — 86803 HEPATITIS C AB TEST: CPT

## 2022-11-03 PROCEDURE — 75574 CT ANGIO HRT W/3D IMAGE: CPT

## 2022-11-03 PROCEDURE — 85730 THROMBOPLASTIN TIME PARTIAL: CPT

## 2022-11-03 PROCEDURE — 82962 GLUCOSE BLOOD TEST: CPT

## 2022-11-03 PROCEDURE — 85610 PROTHROMBIN TIME: CPT

## 2022-11-03 PROCEDURE — 86850 RBC ANTIBODY SCREEN: CPT

## 2022-11-03 PROCEDURE — 93662 INTRACARDIAC ECG (ICE): CPT

## 2022-11-03 PROCEDURE — C1733: CPT

## 2022-11-03 PROCEDURE — 80053 COMPREHEN METABOLIC PANEL: CPT

## 2022-11-03 PROCEDURE — 93306 TTE W/DOPPLER COMPLETE: CPT

## 2022-11-03 PROCEDURE — C1760: CPT

## 2022-11-03 PROCEDURE — C1769: CPT

## 2022-11-03 PROCEDURE — 93656 COMPRE EP EVAL ABLTJ ATR FIB: CPT

## 2022-11-03 PROCEDURE — C1766: CPT

## 2022-11-03 PROCEDURE — 85347 COAGULATION TIME ACTIVATED: CPT

## 2022-11-03 PROCEDURE — 85027 COMPLETE CBC AUTOMATED: CPT

## 2022-11-03 PROCEDURE — C1759: CPT

## 2022-11-03 PROCEDURE — 82565 ASSAY OF CREATININE: CPT

## 2022-11-03 PROCEDURE — 82803 BLOOD GASES ANY COMBINATION: CPT

## 2022-11-03 PROCEDURE — 86901 BLOOD TYPING SEROLOGIC RH(D): CPT

## 2022-11-03 PROCEDURE — 93005 ELECTROCARDIOGRAM TRACING: CPT

## 2022-11-03 PROCEDURE — C1894: CPT

## 2022-11-03 PROCEDURE — C1730: CPT

## 2022-11-03 PROCEDURE — 86900 BLOOD TYPING SEROLOGIC ABO: CPT

## 2022-11-03 PROCEDURE — 36415 COLL VENOUS BLD VENIPUNCTURE: CPT

## 2022-11-03 PROCEDURE — C1893: CPT

## 2022-11-03 PROCEDURE — 93306 TTE W/DOPPLER COMPLETE: CPT | Mod: 26

## 2022-11-03 PROCEDURE — 93613 INTRACARDIAC EPHYS 3D MAPG: CPT

## 2022-11-03 RX ORDER — PANTOPRAZOLE SODIUM 20 MG/1
1 TABLET, DELAYED RELEASE ORAL
Qty: 30 | Refills: 0
Start: 2022-11-03 | End: 2022-12-02

## 2022-11-03 RX ADMIN — LISINOPRIL 40 MILLIGRAM(S): 2.5 TABLET ORAL at 06:19

## 2022-11-03 RX ADMIN — AMLODIPINE BESYLATE 10 MILLIGRAM(S): 2.5 TABLET ORAL at 06:19

## 2022-11-03 NOTE — DISCHARGE NOTE PROVIDER - CARE PROVIDER_API CALL
Gene Lee)  Cardiac Electrophysiology; Cardiovascular Disease; Internal Medicine  [address] [place] [address]  Phone: [phone]  Fax: [phone]  Follow Up Time: 1 month

## 2022-11-03 NOTE — DISCHARGE NOTE PROVIDER - HOSPITAL COURSE
Mr. Velásquez is a 77 year-old male with hypertension, hyperlipidemia, and persistent atrial fibrillation s/p DCCVx2 who presented for PVI ablation.  The procedure was successful and uncomplicated. He is maintaining sinus rhythm with PVCs post-procedure. His groin access sites are healing well with no bleeding or hematoma. He feels well, denies c/p, sob, lightheadedness, LE edema and syncope.   Mr. Velásquez is a 77 year-old male with hypertension, hyperlipidemia, and persistent atrial fibrillation s/p DCCVx2 who presented for PVI ablation.  The procedure was successful and uncomplicated. He is maintaining sinus rhythm with PVCs post-procedure. His groin access sites are healing well with no bleeding or hematoma. He feels well, denies c/p, sob, lightheadedness, LE edema and syncope.    The patient had a repeat echo now in sinus rhythm. His systolic function is preserved, and his MR is read as mild (previously read as moderate). He was also found to have pulmonary hypertension (PASP 41mmHg), and mild LVH. Results communicated with the patient.

## 2022-11-03 NOTE — DISCHARGE NOTE NURSING/CASE MANAGEMENT/SOCIAL WORK - NSDCPEFALRISK_GEN_ALL_CORE
For information on Fall & Injury Prevention, visit: [email or web address] OR  [email or web address] OR  https://www.cdc.gov/steadi/patient.html

## 2022-11-03 NOTE — DISCHARGE NOTE PROVIDER - NSDCCPCAREPLAN_GEN_ALL_CORE_FT
PRINCIPAL DISCHARGE DIAGNOSIS  Diagnosis: Persistent atrial fibrillation  Assessment and Plan of Treatment: You had an ablation of your atrial fibrillation substrate. To do this, catheters were placed into the veins of your groins. Please allow one week for this area to fully heal. Avoid lifting more than 5lbs, strenuous activity, straining and squatting. Continue taking Xarelto without any missed doses as this will prevent a stroke.  Continue all of your other medications.   You have been started on one new medicine called pantoprazole. Please take this for one month. It is to prevent acid reflux from irritating the esophagus post-procedure.  Please make a follow-up appointment for early December [facility] Dr. Lee at the [place] office.

## 2022-11-03 NOTE — DISCHARGE NOTE PROVIDER - NSDCFUSCHEDAPPT_GEN_ALL_CORE_FT
Gene Lee  [facility] Physician [facility]  [facility] [address]  Scheduled Appointment: 11/18/2022

## 2022-11-03 NOTE — DISCHARGE NOTE PROVIDER - NSDCMRMEDTOKEN_GEN_ALL_CORE_FT
amLODIPine 10 mg oral tablet: 1 tab(s) orally once a day  pantoprazole 40 mg oral delayed release tablet: 1 tab(s) orally once a day   ramipril 10 mg oral capsule: 1 cap(s) orally once a day  simvastatin 20 mg oral tablet: 1 tab(s) orally once a day (at bedtime)  Xarelto 20 mg oral tablet: 1 tab(s) orally once a day (in the evening)

## 2022-11-04 PROBLEM — E78.5 HYPERLIPIDEMIA, UNSPECIFIED: Chronic | Status: ACTIVE | Noted: 2022-11-02

## 2022-11-04 PROBLEM — I48.19 OTHER PERSISTENT ATRIAL FIBRILLATION: Chronic | Status: ACTIVE | Noted: 2022-11-02

## 2022-11-04 PROBLEM — I10 ESSENTIAL (PRIMARY) HYPERTENSION: Chronic | Status: ACTIVE | Noted: 2022-11-02

## 2022-11-15 LAB
ISTAT ACTK (ACTIVATED CLOTTING TIME KAOLIN): 347 SEC — HIGH (ref 74–137)
ISTAT ACTK (ACTIVATED CLOTTING TIME KAOLIN): 376 SEC — HIGH (ref 74–137)
ISTAT ACTK (ACTIVATED CLOTTING TIME KAOLIN): 393 SEC — HIGH (ref 74–137)

## 2022-11-18 ENCOUNTER — APPOINTMENT (OUTPATIENT)
Dept: HEART AND VASCULAR | Facility: CLINIC | Age: 77
End: 2022-11-18

## 2022-11-18 ENCOUNTER — NON-APPOINTMENT (OUTPATIENT)
Age: 77
End: 2022-11-18

## 2022-11-18 VITALS
DIASTOLIC BLOOD PRESSURE: 90 MMHG | SYSTOLIC BLOOD PRESSURE: 135 MMHG | BODY MASS INDEX: 26.9 KG/M2 | HEIGHT: 73 IN | TEMPERATURE: 98.7 F | HEART RATE: 56 BPM | OXYGEN SATURATION: 97 % | WEIGHT: 203 LBS

## 2022-11-18 DIAGNOSIS — E78.5 HYPERLIPIDEMIA, UNSPECIFIED: ICD-10-CM

## 2022-11-18 DIAGNOSIS — Z79.01 LONG TERM (CURRENT) USE OF ANTICOAGULANTS: ICD-10-CM

## 2022-11-18 DIAGNOSIS — I48.91 UNSPECIFIED ATRIAL FIBRILLATION: ICD-10-CM

## 2022-11-18 DIAGNOSIS — I42.9 CARDIOMYOPATHY, UNSPECIFIED: ICD-10-CM

## 2022-11-18 DIAGNOSIS — I08.1 RHEUMATIC DISORDERS OF BOTH MITRAL AND TRICUSPID VALVES: ICD-10-CM

## 2022-11-18 DIAGNOSIS — I48.19 OTHER PERSISTENT ATRIAL FIBRILLATION: ICD-10-CM

## 2022-11-18 DIAGNOSIS — I10 ESSENTIAL (PRIMARY) HYPERTENSION: ICD-10-CM

## 2022-11-18 DIAGNOSIS — I27.20 PULMONARY HYPERTENSION, UNSPECIFIED: ICD-10-CM

## 2022-11-18 PROCEDURE — 99214 OFFICE O/P EST MOD 30 MIN: CPT | Mod: 25

## 2022-11-18 PROCEDURE — 93000 ELECTROCARDIOGRAM COMPLETE: CPT

## 2022-11-18 RX ORDER — RAMIPRIL 10 MG/1
10 CAPSULE ORAL
Refills: 0 | Status: ACTIVE | COMMUNITY

## 2022-11-18 RX ORDER — SIMVASTATIN 20 MG/1
20 TABLET, FILM COATED ORAL
Refills: 0 | Status: ACTIVE | COMMUNITY

## 2022-11-18 NOTE — DISCUSSION/SUMMARY
[FreeTextEntry1] : Mr. Velásquez is a 77 year-old male with persistent atrial fibrillation with symptoms despite adequate rate control.  His WEAVER improved following DC cardioversion but he reverted back to AF.   As such, he underwent successful PVI in November 2022 but has been suffering paroxysms of AF.  \par \par \par 1.  Rhythm management - I have informed him that this can occur during the blanking period and the fact that he is going in and out of AF without cardioversion is a good sign.  I will start him on low dose amiodarone (200mg daily) for 2-3 months to minimize his AF burden during the blanking period.  We discussed toxicities associated with amiodarone, noting that the risk is lower with low dose for a short period.  Will consider repeat ablation only with AF recurrence outside the blanking period.  \par \par 2.  Stroke management - Maintain Xarelto 20mg indefinitely\par \par 3.  Risk factor management\par DM - A1c at goal\par HTN - BP at goal\par TRISTAN - may need screening in near future\par \par 4.  Referred to Dr. Alfa Witt for general cardiology follow-up [EKG obtained to assist in diagnosis and management of assessed problem(s)] : EKG obtained to assist in diagnosis and management of assessed problem(s)

## 2022-11-18 NOTE — HISTORY OF PRESENT ILLNESS
[FreeTextEntry1] : Mr. Velásquez is a 77 year-old male with hypertension, hyperlipidemia, and persistent atrial fibrillation who presents for follow-up.  He was diagnosed with atrial fibrillation and had been managed with a rate control strategy employing low dose toprol.  Despite adequate rate control, he continued to reports dyspnea upon exertion, reporting SOB after 1/4 mile or a flight of stairs.  He denied palpitations, chest pain, SOB at rest, LE edema, orthopnea, PND, and syncope.  Due to persistent symptoms despite adequate rate control, he underwent DC cardioversion in June 2021.  He reported a dramatic improvement in his symptoms since the cardioversion, with much less WEAVER.  He unfortunately reverted back to AF in August with similar complaints of WEAVER.  He underwent cardioversion again at that time but reverted to AF within a few days.  As such, he underwent successful pulmonary vein isolation in November 2022.  Since then he has been alternating between sinus rhythm and AF (as confirmed by EKG on his Apple Watch).

## 2022-11-18 NOTE — CARDIOLOGY SUMMARY
[LVEF ___%] : LVEF [unfilled]% [___] : [unfilled] [Enlarged] : enlarged LA size [Moderate] : moderate mitral regurgitation [de-identified] : AF at 103 bpm, normal axis/intervals

## 2022-11-20 ENCOUNTER — APPOINTMENT (OUTPATIENT)
Dept: HEART AND VASCULAR | Facility: CLINIC | Age: 77
End: 2022-11-20

## 2022-11-20 DIAGNOSIS — E11.9 TYPE 2 DIABETES MELLITUS W/OUT COMPLICATIONS: ICD-10-CM

## 2022-11-20 DIAGNOSIS — Z82.49 FAMILY HISTORY OF ISCHEMIC HEART DISEASE AND OTHER DISEASES OF THE CIRCULATORY SYSTEM: ICD-10-CM

## 2022-11-20 DIAGNOSIS — Z80.0 FAMILY HISTORY OF MALIGNANT NEOPLASM OF DIGESTIVE ORGANS: ICD-10-CM

## 2022-11-20 DIAGNOSIS — Z86.010 PERSONAL HISTORY OF COLONIC POLYPS: ICD-10-CM

## 2022-11-20 DIAGNOSIS — Z80.9 FAMILY HISTORY OF MALIGNANT NEOPLASM, UNSPECIFIED: ICD-10-CM

## 2022-11-20 DIAGNOSIS — R06.09 OTHER FORMS OF DYSPNEA: ICD-10-CM

## 2022-11-20 DIAGNOSIS — Z87.438 PERSONAL HISTORY OF OTHER DISEASES OF MALE GENITAL ORGANS: ICD-10-CM

## 2022-11-20 DIAGNOSIS — I37.1 NONRHEUMATIC PULMONARY VALVE INSUFFICIENCY: ICD-10-CM

## 2022-11-20 PROCEDURE — 99205 OFFICE O/P NEW HI 60 MIN: CPT | Mod: 95

## 2022-11-20 RX ORDER — METFORMIN ER 500 MG 500 MG/1
500 TABLET ORAL
Qty: 60 | Refills: 0 | Status: DISCONTINUED | COMMUNITY
Start: 2022-11-09 | End: 2022-11-20

## 2022-11-20 RX ORDER — AMLODIPINE BESYLATE 10 MG/1
10 TABLET ORAL
Refills: 0 | Status: ACTIVE | COMMUNITY

## 2022-11-20 RX ORDER — CHLORHEXIDINE GLUCONATE 4 %
LIQUID (ML) TOPICAL
Refills: 0 | Status: ACTIVE | COMMUNITY

## 2022-11-20 NOTE — REASON FOR VISIT
[Arrhythmia/ECG Abnorrmalities] : arrhythmia/ECG abnormalities [Structural Heart and Valve Disease] : structural heart and valve disease [Hyperlipidemia] : hyperlipidemia [Hypertension] : hypertension [Coronary Artery Disease] : coronary artery disease [Carotid, Aortic and Peripheral Vascular Disease] : carotid, aortic and peripheral vascular disease [FreeTextEntry3] : Bautista De Paz

## 2022-11-20 NOTE — DISCUSSION/SUMMARY
[Paroxysmal Atrial Fibrillation] : paroxysmal atrial fibrillation [Electrical Cardioversion] : electrical cardioversion [Medication Changes Per Orders] : Medication changes are as documented in orders [Catheter Ablation Arrhythmogenic Foci] : catheter ablation of the arrhythmogenic foci [Coronary Artery Disease] : coronary artery disease [Hyperlipidemia] : hyperlipidemia [Diet Modification] : diet modification [Exercise] : exercise [Hypertension] : hypertension [Exercise Regimen] : an exercise regimen [Dietary Modification] : dietary modification [Low Sodium Diet] : low sodium diet [Moderate Mitral Regurgitation] : moderate mitral regurgitation [Compensated] : compensated [Sodium Restriction] : sodium restriction [Exercise Rehab] : exercise rehabilitation [Pulmonary Hypertension (PH)] : pulmonary hypertension (PH) [Stable] : stable [None] : There are no changes in medication management [Patient] : the patient [de-identified] : inc coronary calcium score and disease in the LAD/RCA?OM [de-identified] : mild [de-identified] : asc aorta 4.0 cm; aorta plaque [FreeTextEntry1] : TR - mod\par PI - mild\par \par DM

## 2022-11-20 NOTE — HISTORY OF PRESENT ILLNESS
[FreeTextEntry1] : Mario Velásquez is a 76 yo male who presents for televideo CV eval.  Consent obtained and recorded.  He is at his home and doctor is in [place], [place].\par \par He has been followed by Dr Rocky Caballero in the past.\par \par TTE 11/2020: nl lv sys fxn; RA/RV dilated; nl RV sys fxn; LVH; mod MR/TR; mild PI; mild pulm htn; asc aorta 4.0 cm\par LExiscan Nuc 10/2020: no perfusion defect; nl LVEF\par \par CV Clinical Hx:\par 1. 6/2021: AFIB dx - DCCV\par 2. 8/2022: recurrent AFIB  - DCCV\par 3. 8/2022 - recurrent AFIB - s/p ablation 11/2022\par 4. CTA 11/2022: calcium score 595: mild LAD disease; nonobst CX; min RCA; mild RPDA\par 4. 11/2022 - intermittent AFIB - low dose amiodarone started\par \par He has occasional nonexertional chest tightness.  He has WEAVER.  He denies pnd, orthopnea, edema, palp, or loc.\par \par He tries to stay active.  He is compliant with meds.\par \par Clinical hx reviewed in detail.\par \par PE not performed\par \par Recommendations:\par 1. collect records\par 2. continue current meds\par 3. EP followup\par 4. EXSE\par 5. CPET\par 6 carotid uplex\par \par

## 2022-12-16 ENCOUNTER — APPOINTMENT (OUTPATIENT)
Dept: HEART AND VASCULAR | Facility: CLINIC | Age: 77
End: 2022-12-16

## 2022-12-16 ENCOUNTER — NON-APPOINTMENT (OUTPATIENT)
Age: 77
End: 2022-12-16

## 2022-12-16 VITALS
OXYGEN SATURATION: 97 % | HEART RATE: 78 BPM | HEIGHT: 73 IN | SYSTOLIC BLOOD PRESSURE: 110 MMHG | TEMPERATURE: 98.7 F | DIASTOLIC BLOOD PRESSURE: 70 MMHG | WEIGHT: 199 LBS | BODY MASS INDEX: 26.37 KG/M2

## 2022-12-16 PROCEDURE — 93000 ELECTROCARDIOGRAM COMPLETE: CPT

## 2022-12-16 PROCEDURE — 99214 OFFICE O/P EST MOD 30 MIN: CPT | Mod: 25

## 2022-12-18 NOTE — DISCUSSION/SUMMARY
[FreeTextEntry1] : Mr. Velásquez is a 77 year-old male with persistent atrial fibrillation with symptoms despite adequate rate control.  His WEAVER improved following DC cardioversion but he reverted back to AF.   As such, he underwent successful PVI in November 2022 but has been suffering a recurrenc of AF.  \par \par \par 1.  Rhythm management - I have informed him that this can occur during the blanking period and he has been maintained on amiodarone.  Will plan for DCCV in the coming week or two\par \par 2.  Stroke management - Maintain Xarelto 20mg indefinitely\par \par 3.  Risk factor management\par DM - A1c at goal\par HTN - BP at goal\par TRISTAN - may need screening in near future\par \par 4.  Referred to Dr. Alfa Witt for general cardiology follow-up

## 2022-12-18 NOTE — HISTORY OF PRESENT ILLNESS
[FreeTextEntry1] : Mr. Velásquez is a 77 year-old male with hypertension, hyperlipidemia, and persistent atrial fibrillation who presents for follow-up.  He was diagnosed with atrial fibrillation and had been managed with a rate control strategy employing low dose toprol.  Despite adequate rate control, he continued to reports dyspnea upon exertion, reporting SOB after 1/4 mile or a flight of stairs.  He denied palpitations, chest pain, SOB at rest, LE edema, orthopnea, PND, and syncope.  Due to persistent symptoms despite adequate rate control, he underwent DC cardioversion in June 2021.  He reported a dramatic improvement in his symptoms since the cardioversion, with much less WEAVER.  He unfortunately reverted back to AF in August with similar complaints of WEAVER.  He underwent cardioversion again at that time but reverted to AF within a few days.  As such, he underwent successful pulmonary vein isolation in November 2022.  Since then he has reverted back to atrial fibrillation and reports fatigue and WEAVER.

## 2022-12-18 NOTE — CARDIOLOGY SUMMARY
[LVEF ___%] : LVEF [unfilled]% [___] : [unfilled] [Enlarged] : enlarged LA size [Moderate] : moderate mitral regurgitation [de-identified] : AF at 81 bpm, normal axis/intervals

## 2022-12-20 ENCOUNTER — RESULT REVIEW (OUTPATIENT)
Age: 77
End: 2022-12-20

## 2022-12-23 ENCOUNTER — APPOINTMENT (OUTPATIENT)
Dept: HEART AND VASCULAR | Facility: CLINIC | Age: 77
End: 2022-12-23

## 2023-01-06 ENCOUNTER — APPOINTMENT (OUTPATIENT)
Dept: HEART AND VASCULAR | Facility: CLINIC | Age: 78
End: 2023-01-06

## 2023-01-20 ENCOUNTER — APPOINTMENT (OUTPATIENT)
Dept: HEART AND VASCULAR | Facility: CLINIC | Age: 78
End: 2023-01-20

## 2023-01-20 ENCOUNTER — APPOINTMENT (OUTPATIENT)
Dept: HEART AND VASCULAR | Facility: CLINIC | Age: 78
End: 2023-01-20
Payer: MEDICARE

## 2023-01-20 ENCOUNTER — NON-APPOINTMENT (OUTPATIENT)
Age: 78
End: 2023-01-20

## 2023-01-20 VITALS
SYSTOLIC BLOOD PRESSURE: 110 MMHG | BODY MASS INDEX: 26.24 KG/M2 | WEIGHT: 198 LBS | TEMPERATURE: 98.7 F | HEART RATE: 69 BPM | OXYGEN SATURATION: 97 % | HEIGHT: 73 IN | DIASTOLIC BLOOD PRESSURE: 60 MMHG

## 2023-01-20 PROCEDURE — 93000 ELECTROCARDIOGRAM COMPLETE: CPT

## 2023-01-20 PROCEDURE — 99214 OFFICE O/P EST MOD 30 MIN: CPT | Mod: 25

## 2023-01-22 NOTE — HISTORY OF PRESENT ILLNESS
[FreeTextEntry1] : Mr. Velásquez is a 77 year-old male with hypertension, hyperlipidemia, and persistent atrial fibrillation who presents for follow-up.  He was diagnosed with atrial fibrillation and had been managed with a rate control strategy employing low dose toprol.  Despite adequate rate control, he continued to reports dyspnea upon exertion, reporting SOB after 1/4 mile or a flight of stairs.  He denied palpitations, chest pain, SOB at rest, LE edema, orthopnea, PND, and syncope.  Due to persistent symptoms despite adequate rate control, he underwent DC cardioversion in June 2021.  He reported a dramatic improvement in his symptoms since the cardioversion, with much less WEAVER.  He unfortunately reverted back to AF in August with similar complaints of WEAVER.  He underwent cardioversion again at that time but reverted to AF within a few days.  As such, he underwent successful pulmonary vein isolation in November 2022.  He developed AF during the blanking period and was started on amiodarone with repeat DCCV.  He feels well since without any AF recurrence on his Apple Watch.

## 2023-01-22 NOTE — DISCUSSION/SUMMARY
[FreeTextEntry1] : Mr. Velásquez is a 77 year-old male with persistent atrial fibrillation with symptoms despite adequate rate control.  His WEAVER improved following DC cardioversion but he reverted back to AF.   As such, he underwent successful PVI in November 2022 but has been suffering a recurrenc of AF.  \par \par \par 1.  Rhythm management \par -maintain amiodarone 200mg daily with plans to taper off starting next visit\par \par 2.  Stroke management - Maintain Xarelto 20mg indefinitely\par \par 3.  Risk factor management\par DM - A1c at goal\par HTN - BP at goal\par TRISTAN - may need screening in near future\par \par 4.  Referred to Dr. Alfa Witt for general cardiology follow-up

## 2023-01-22 NOTE — CARDIOLOGY SUMMARY
[de-identified] : NSR at 64 bpm, normal axis/intervals [LVEF ___%] : LVEF [unfilled]% [___] : [unfilled] [Enlarged] : enlarged LA size [Moderate] : moderate mitral regurgitation

## 2023-02-03 ENCOUNTER — TRANSCRIPTION ENCOUNTER (OUTPATIENT)
Age: 78
End: 2023-02-03

## 2023-02-06 ENCOUNTER — APPOINTMENT (OUTPATIENT)
Dept: HEART AND VASCULAR | Facility: CLINIC | Age: 78
End: 2023-02-06

## 2023-02-28 ENCOUNTER — APPOINTMENT (OUTPATIENT)
Dept: HEART AND VASCULAR | Facility: CLINIC | Age: 78
End: 2023-02-28
Payer: MEDICARE

## 2023-02-28 VITALS
DIASTOLIC BLOOD PRESSURE: 80 MMHG | WEIGHT: 198 LBS | BODY MASS INDEX: 26.24 KG/M2 | SYSTOLIC BLOOD PRESSURE: 130 MMHG | HEIGHT: 73 IN | HEART RATE: 73 BPM

## 2023-02-28 PROCEDURE — 94010 BREATHING CAPACITY TEST: CPT | Mod: 59

## 2023-02-28 PROCEDURE — 94727 GAS DIL/WSHOT DETER LNG VOL: CPT

## 2023-02-28 PROCEDURE — 94621 CARDIOPULM EXERCISE TESTING: CPT

## 2023-02-28 PROCEDURE — 94729 DIFFUSING CAPACITY: CPT

## 2023-04-12 ENCOUNTER — APPOINTMENT (OUTPATIENT)
Dept: HEART AND VASCULAR | Facility: CLINIC | Age: 78
End: 2023-04-12
Payer: MEDICARE

## 2023-04-12 PROCEDURE — 93306 TTE W/DOPPLER COMPLETE: CPT

## 2023-04-17 ENCOUNTER — NON-APPOINTMENT (OUTPATIENT)
Age: 78
End: 2023-04-17

## 2023-06-07 ENCOUNTER — APPOINTMENT (OUTPATIENT)
Dept: HEART AND VASCULAR | Facility: CLINIC | Age: 78
End: 2023-06-07
Payer: MEDICARE

## 2023-06-07 VITALS
OXYGEN SATURATION: 98 % | HEART RATE: 72 BPM | DIASTOLIC BLOOD PRESSURE: 78 MMHG | SYSTOLIC BLOOD PRESSURE: 126 MMHG | HEIGHT: 73 IN | WEIGHT: 197 LBS | BODY MASS INDEX: 26.11 KG/M2

## 2023-06-07 PROCEDURE — 93306 TTE W/DOPPLER COMPLETE: CPT

## 2023-06-07 PROCEDURE — 93880 EXTRACRANIAL BILAT STUDY: CPT

## 2023-06-07 PROCEDURE — 93015 CV STRESS TEST SUPVJ I&R: CPT

## 2023-06-12 ENCOUNTER — NON-APPOINTMENT (OUTPATIENT)
Age: 78
End: 2023-06-12

## 2023-06-22 ENCOUNTER — APPOINTMENT (OUTPATIENT)
Dept: HEART AND VASCULAR | Facility: CLINIC | Age: 78
End: 2023-06-22
Payer: MEDICARE

## 2023-06-22 VITALS
HEIGHT: 73 IN | HEART RATE: 72 BPM | OXYGEN SATURATION: 97 % | RESPIRATION RATE: 16 BRPM | TEMPERATURE: 97.6 F | SYSTOLIC BLOOD PRESSURE: 140 MMHG | WEIGHT: 197 LBS | DIASTOLIC BLOOD PRESSURE: 90 MMHG | BODY MASS INDEX: 26.11 KG/M2

## 2023-06-22 DIAGNOSIS — I65.29 OCCLUSION AND STENOSIS OF UNSPECIFIED CAROTID ARTERY: ICD-10-CM

## 2023-06-22 DIAGNOSIS — E78.5 HYPERLIPIDEMIA, UNSPECIFIED: ICD-10-CM

## 2023-06-22 DIAGNOSIS — R94.39 ABNORMAL RESULT OF OTHER CARDIOVASCULAR FUNCTION STUDY: ICD-10-CM

## 2023-06-22 DIAGNOSIS — I51.7 CARDIOMEGALY: ICD-10-CM

## 2023-06-22 DIAGNOSIS — I07.1 RHEUMATIC TRICUSPID INSUFFICIENCY: ICD-10-CM

## 2023-06-22 DIAGNOSIS — I77.810 THORACIC AORTIC ECTASIA: ICD-10-CM

## 2023-06-22 DIAGNOSIS — I27.20 PULMONARY HYPERTENSION, UNSPECIFIED: ICD-10-CM

## 2023-06-22 DIAGNOSIS — I34.0 NONRHEUMATIC MITRAL (VALVE) INSUFFICIENCY: ICD-10-CM

## 2023-06-22 DIAGNOSIS — I25.10 ATHEROSCLEROTIC HEART DISEASE OF NATIVE CORONARY ARTERY W/OUT ANGINA PECTORIS: ICD-10-CM

## 2023-06-22 DIAGNOSIS — I10 ESSENTIAL (PRIMARY) HYPERTENSION: ICD-10-CM

## 2023-06-22 DIAGNOSIS — I70.0 ATHEROSCLEROSIS OF AORTA: ICD-10-CM

## 2023-06-22 DIAGNOSIS — I51.89 OTHER ILL-DEFINED HEART DISEASES: ICD-10-CM

## 2023-06-22 PROCEDURE — 99215 OFFICE O/P EST HI 40 MIN: CPT

## 2023-06-22 RX ORDER — METFORMIN HYDROCHLORIDE 500 MG/1
500 TABLET, COATED ORAL
Refills: 0 | Status: ACTIVE | COMMUNITY

## 2023-06-22 RX ORDER — UBIDECARENONE 200 MG
CAPSULE ORAL
Refills: 0 | Status: ACTIVE | COMMUNITY

## 2023-06-22 RX ORDER — UBIDECARENONE/VIT E ACET 100MG-5
CAPSULE ORAL
Refills: 0 | Status: DISCONTINUED | COMMUNITY
End: 2023-06-22

## 2023-06-22 RX ORDER — AMIODARONE HYDROCHLORIDE 200 MG/1
200 TABLET ORAL
Qty: 30 | Refills: 3 | Status: DISCONTINUED | COMMUNITY
Start: 2022-11-18 | End: 2023-06-22

## 2023-06-25 PROBLEM — E78.5 HYPERLIPIDEMIA, MILD: Status: ACTIVE | Noted: 2021-04-19

## 2023-06-25 PROBLEM — I10 BENIGN ESSENTIAL HYPERTENSION: Status: ACTIVE | Noted: 2021-04-19

## 2023-06-25 PROBLEM — R94.39 ABNORMAL STRESS ECG: Status: ACTIVE | Noted: 2023-06-25

## 2023-06-25 PROBLEM — I65.29 CAROTID ARTERY PLAQUE: Status: ACTIVE | Noted: 2023-06-25

## 2023-06-25 PROBLEM — I07.1 MODERATE TRICUSPID REGURGITATION: Status: ACTIVE | Noted: 2022-11-20

## 2023-06-25 PROBLEM — I34.0 MODERATE MITRAL REGURGITATION: Status: ACTIVE | Noted: 2022-11-20

## 2023-06-25 PROBLEM — I77.810 ASCENDING AORTA DILATATION: Status: ACTIVE | Noted: 2022-11-20

## 2023-06-25 PROBLEM — I27.20 PULMONARY HTN: Status: ACTIVE | Noted: 2022-11-20

## 2023-06-25 PROBLEM — I25.10 ATHEROSCLEROSIS OF CORONARY ARTERY: Status: ACTIVE | Noted: 2022-11-20

## 2023-06-25 PROBLEM — I51.7 LEFT VENTRICULAR HYPERTROPHY: Status: ACTIVE | Noted: 2022-11-20

## 2023-06-25 PROBLEM — I51.89 DIASTOLIC DYSFUNCTION: Status: ACTIVE | Noted: 2023-06-25

## 2023-06-25 PROBLEM — I70.0 ATHEROSCLEROSIS OF AORTA: Status: ACTIVE | Noted: 2022-11-20

## 2023-06-25 NOTE — REASON FOR VISIT
[Arrhythmia/ECG Abnorrmalities] : arrhythmia/ECG abnormalities [Structural Heart and Valve Disease] : structural heart and valve disease [Hypertension] : hypertension [Hyperlipidemia] : hyperlipidemia [Coronary Artery Disease] : coronary artery disease [Carotid, Aortic and Peripheral Vascular Disease] : carotid, aortic and peripheral vascular disease [Spouse] : spouse [FreeTextEntry3] : Bautista De Paz

## 2023-06-25 NOTE — DISCUSSION/SUMMARY
[Paroxysmal Atrial Fibrillation] : paroxysmal atrial fibrillation [Medication Changes Per Orders] : Medication changes are as documented in orders [Electrical Cardioversion] : electrical cardioversion [Catheter Ablation Arrhythmogenic Foci] : catheter ablation of the arrhythmogenic foci [Coronary Artery Disease] : coronary artery disease [Hyperlipidemia] : hyperlipidemia [Diet Modification] : diet modification [Exercise] : exercise [Hypertension] : hypertension [Exercise Regimen] : an exercise regimen [Dietary Modification] : dietary modification [Low Sodium Diet] : low sodium diet [Mild Mitral Regurgitation] : mild mitral regurgitation [Moderate Mitral Regurgitation] : moderate mitral regurgitation [Compensated] : compensated [Sodium Restriction] : sodium restriction [Exercise Rehab] : exercise rehabilitation [Pulmonary Hypertension (PH)] : pulmonary hypertension (PH) [None] : There are no changes in medication management [Stable] : stable [Patient] : the patient [de-identified] : LVH; leonardo dysfxn [de-identified] : amild LICA and min RUPERT 6/2023; asc aorta 4.0 cm; aorta plaque [de-identified] : mild [FreeTextEntry1] : TR - mild to mod\par PI - trivial\par \par DM

## 2023-06-25 NOTE — PHYSICAL EXAM
[Well Developed] : well developed [Well Nourished] : well nourished [No Acute Distress] : no acute distress [Normal Conjunctiva] : normal conjunctiva [Normal Venous Pressure] : normal venous pressure [No Carotid Bruit] : no carotid bruit [Normal S1, S2] : normal S1, S2 [No Rub] : no rub [No Gallop] : no gallop [Murmur] : murmur [Clear Lung Fields] : clear lung fields [Good Air Entry] : good air entry [No Respiratory Distress] : no respiratory distress  [Soft] : abdomen soft [Non Tender] : non-tender [No Masses/organomegaly] : no masses/organomegaly [Normal Bowel Sounds] : normal bowel sounds [Normal Gait] : normal gait [No Edema] : no edema [No Cyanosis] : no cyanosis [No Clubbing] : no clubbing [No Varicosities] : no varicosities [No Rash] : no rash [No Skin Lesions] : no skin lesions [Moves all extremities] : moves all extremities [No Focal Deficits] : no focal deficits [Alert and Oriented] : alert and oriented [Normal Speech] : normal speech [Normal memory] : normal memory [de-identified] : sys murmur

## 2023-06-25 NOTE — HISTORY OF PRESENT ILLNESS
[FreeTextEntry1] : Mario Velásquez returns for follow up.  i\par \par He has been followed by Dr Rocky Caballero in the past.\par \par TTE 11/2020: nl lv sys fxn; RA/RV dilated; nl RV sys fxn; LVH; mod MR/TR; mild PI; mild pulm htn; asc aorta 4.0 cm\par Lexiscan Nuc 10/2020: no perfusion defect; nl LVEF\par \par CV Clinical Hx:\par 1. 6/2021: AFIB dx - DCCV\par 2. 8/2022: recurrent AFIB  - DCCV\par 3. 8/2022 - recurrent AFIB - s/p ablation 11/2022\par 4. CTA 11/2022: calcium score 595: mild LAD disease; nonobst CX; min RCA; mild RPDA\par 4. 11/2022 - intermittent AFIB - low dose amiodarone started\par \par He occasionally experiences WEAVER.  He cp, sob, denies pnd, orthopnea, edema, palp, or loc.\par \par He tries to stay active.  He is compliant with meds.\par \par CPET 2/2023: dec hr response to exercise (66% predicted heart rate); 77% predicted peak VO2\par EXSE 6/2023: nl lv sys fxn; leonardo dysfxn; mild to mod TR/MR; mild pulm htn; dilated aortic root/asc aorta (3.9 cm/4.2 cm); 8:30 min Kev; pos EC G; no ischemia by WM ( dec sens dec hr)\par Carotid Duplex 6/2023: mild LICA and min RUPERT\par \par Clinical hx and results reviewed in detail.\par \par Recommendations:\par 1. exercise\par 2. Mediterranean diet\par 3. continue current meds\par 4. EP followup\par 5. f/u in 6 months\par \par \par

## 2023-08-01 ENCOUNTER — APPOINTMENT (OUTPATIENT)
Dept: HEART AND VASCULAR | Facility: CLINIC | Age: 78
End: 2023-08-01

## 2023-09-12 ENCOUNTER — OUTPATIENT (OUTPATIENT)
Dept: OUTPATIENT SERVICES | Facility: HOSPITAL | Age: 78
LOS: 1 days | Discharge: HOME | End: 2023-09-12
Payer: MEDICARE

## 2023-09-12 LAB
ISTAT INR: 2.2 — HIGH (ref 0.88–1.16)
ISTAT PT: 25.1 SEC — HIGH (ref 10–12.9)
ISTAT VENOUS BE: 4 MMOL/L — HIGH (ref -2–3)
ISTAT VENOUS GLUCOSE: 130 MG/DL — HIGH (ref 70–99)
ISTAT VENOUS HCO3: 31 MMOL/L — HIGH (ref 23–28)
ISTAT VENOUS HEMATOCRIT: 45 % — SIGNIFICANT CHANGE UP (ref 39–50)
ISTAT VENOUS HEMOGLOBIN: 15.3 GM/DL — SIGNIFICANT CHANGE UP (ref 13–17)
ISTAT VENOUS IONIZED CALCIUM: 1.24 MMOL/L — SIGNIFICANT CHANGE UP (ref 1.12–1.3)
ISTAT VENOUS PCO2: 54 MMHG — HIGH (ref 41–51)
ISTAT VENOUS PH: 7.37 — SIGNIFICANT CHANGE UP (ref 7.31–7.41)
ISTAT VENOUS PO2: <66 MMHG — LOW (ref 35–40)
ISTAT VENOUS POTASSIUM: 4 MMOL/L — SIGNIFICANT CHANGE UP (ref 3.5–5.3)
ISTAT VENOUS SO2: 35 % — SIGNIFICANT CHANGE UP
ISTAT VENOUS SODIUM: 142 MMOL/L — SIGNIFICANT CHANGE UP (ref 135–145)
ISTAT VENOUS TCO2: 33 MMOL/L — HIGH (ref 22–31)

## 2023-09-12 PROCEDURE — 82330 ASSAY OF CALCIUM: CPT

## 2023-09-12 PROCEDURE — 92960 CARDIOVERSION ELECTRIC EXT: CPT

## 2023-09-12 PROCEDURE — 85610 PROTHROMBIN TIME: CPT

## 2023-09-12 PROCEDURE — 85014 HEMATOCRIT: CPT

## 2023-09-12 PROCEDURE — 82947 ASSAY GLUCOSE BLOOD QUANT: CPT

## 2023-09-12 PROCEDURE — 84295 ASSAY OF SERUM SODIUM: CPT

## 2023-09-12 PROCEDURE — 84132 ASSAY OF SERUM POTASSIUM: CPT

## 2023-09-12 PROCEDURE — 82803 BLOOD GASES ANY COMBINATION: CPT

## 2023-09-12 RX ORDER — DRONEDARONE 400 MG/1
400 TABLET, FILM COATED ORAL ONCE
Refills: 0 | Status: COMPLETED | OUTPATIENT
Start: 2023-09-12 | End: 2023-09-12

## 2023-09-12 RX ADMIN — DRONEDARONE 400 MILLIGRAM(S): 400 TABLET, FILM COATED ORAL at 14:32

## 2023-09-12 NOTE — PROGRESS NOTE ADULT - SUBJECTIVE AND OBJECTIVE BOX
EPS Progress Note    S: 77 year-old male with persistent atrial fibrillation with symptoms despite adequate rate control. His WEAVER improved following DC cardioversion but he reverted back to AF. As such, he underwent successful PVI in November 2022 but has been suffering a recurrenc of AF. He presents today for scheduled DCCV.   He is complaint with his medication , no interruptions with Xarelto.   ?  ?     MEDICATIONS  (STANDING):  ramapril 10 mg   xarelto 20 mg   amlodipine 10 mg   metformin 10 mg   mg 500 mg       Telemetry: atrial fibrillation            General:  NAD        HEENT:   PERRL, EOMI	  Neck: Supple, - JVD   Cardiovascular: S1 S2, No JVD  Respiratory: CTA B/L        Gastrointestinal:  Soft, Non-tender, + BS	  Skin: No rashes, No ecchymoses, No cyanosis  Extremities: No edema  Psychiatry: A & O x 3                                      Assessment/Plan:  77 year-old male with persistent atrial fibrillation , s/p PVI, s/p DCCV with recurrent atrial fibrillation presents for scheduled DCCV, will discharge home today, will start Multaq 400 mg BID.

## 2023-09-13 RX ORDER — DRONEDARONE 400 MG/1
1 TABLET, FILM COATED ORAL
Qty: 60 | Refills: 1
Start: 2023-09-13 | End: 2023-11-11

## 2023-11-28 ENCOUNTER — APPOINTMENT (OUTPATIENT)
Dept: HEART AND VASCULAR | Facility: CLINIC | Age: 78
End: 2023-11-28
Payer: MEDICARE

## 2023-11-28 VITALS
DIASTOLIC BLOOD PRESSURE: 90 MMHG | BODY MASS INDEX: 24.54 KG/M2 | SYSTOLIC BLOOD PRESSURE: 138 MMHG | HEART RATE: 83 BPM | HEIGHT: 77 IN | OXYGEN SATURATION: 97 % | WEIGHT: 207.8 LBS

## 2023-11-28 PROCEDURE — 99214 OFFICE O/P EST MOD 30 MIN: CPT

## 2023-12-19 ENCOUNTER — APPOINTMENT (OUTPATIENT)
Dept: HEART AND VASCULAR | Facility: CLINIC | Age: 78
End: 2023-12-19

## 2023-12-22 ENCOUNTER — APPOINTMENT (OUTPATIENT)
Dept: HEART AND VASCULAR | Facility: CLINIC | Age: 78
End: 2023-12-22
Payer: MEDICARE

## 2023-12-22 VITALS
SYSTOLIC BLOOD PRESSURE: 130 MMHG | WEIGHT: 205 LBS | TEMPERATURE: 98.7 F | BODY MASS INDEX: 24.21 KG/M2 | HEIGHT: 77 IN | DIASTOLIC BLOOD PRESSURE: 80 MMHG

## 2023-12-22 PROCEDURE — 93285 PRGRMG DEV EVAL SCRMS IP: CPT

## 2023-12-22 PROCEDURE — 99213 OFFICE O/P EST LOW 20 MIN: CPT | Mod: 25

## 2023-12-29 RX ORDER — METOPROLOL SUCCINATE 25 MG/1
25 TABLET, EXTENDED RELEASE ORAL
Refills: 0 | Status: ACTIVE | COMMUNITY

## 2023-12-29 NOTE — PHYSICAL EXAM
[Well Developed] : well developed [No Acute Distress] : no acute distress [Well Nourished] : well nourished [Normal Venous Pressure] : normal venous pressure [No Carotid Bruit] : no carotid bruit [Normal S1, S2] : normal S1, S2 [No Murmur] : no murmur [No Rub] : no rub [No Gallop] : no gallop [Clear Lung Fields] : clear lung fields [Good Air Entry] : good air entry [No Respiratory Distress] : no respiratory distress  [Soft] : abdomen soft [Non Tender] : non-tender [Normal Bowel Sounds] : normal bowel sounds [No Masses/organomegaly] : no masses/organomegaly [Normal Gait] : normal gait [No Edema] : no edema [No Cyanosis] : no cyanosis [No Clubbing] : no clubbing [No Varicosities] : no varicosities [No Skin Lesions] : no skin lesions [No Rash] : no rash [Moves all extremities] : moves all extremities [No Focal Deficits] : no focal deficits [Normal Speech] : normal speech [Alert and Oriented] : alert and oriented [Normal memory] : normal memory [General Appearance - Well Developed] : well developed [Normal Appearance] : normal appearance [Well Groomed] : well groomed [General Appearance - Well Nourished] : well nourished [No Deformities] : no deformities [General Appearance - In No Acute Distress] : no acute distress [Heart Rate And Rhythm] : heart rate and rhythm were normal [Heart Sounds] : normal S1 and S2 [Murmurs] : no murmurs present [Respiration, Rhythm And Depth] : normal respiratory rhythm and effort [Exaggerated Use Of Accessory Muscles For Inspiration] : no accessory muscle use [Auscultation Breath Sounds / Voice Sounds] : lungs were clear to auscultation bilaterally [Clean] : clean [Dry] : dry [Well-Healed] : well-healed [Abdomen Soft] : soft [Abdomen Tenderness] : non-tender [Abdomen Mass (___ Cm)] : no abdominal mass palpated [Nail Clubbing] : no clubbing of the fingernails [Cyanosis, Localized] : no localized cyanosis [Petechial Hemorrhages (___cm)] : no petechial hemorrhages [] : no ischemic changes [Normal Conjunctiva] : the conjunctiva exhibited no abnormalities [Eyelids - No Xanthelasma] : the eyelids demonstrated no xanthelasmas [Normal Oral Mucosa] : normal oral mucosa [No Oral Pallor] : no oral pallor [No Oral Cyanosis] : no oral cyanosis [Normal Jugular Venous A Waves Present] : normal jugular venous A waves present [Normal Jugular Venous V Waves Present] : normal jugular venous V waves present [No Jugular Venous Johnson A Waves] : no jugular venous johnson A waves [Abnormal Walk] : normal gait [Gait - Sufficient For Exercise Testing] : the gait was sufficient for exercise testing

## 2023-12-29 NOTE — REVIEW OF SYSTEMS
[Negative] : Heme/Lymph [Feeling Fatigued] : not feeling fatigued [SOB] : no shortness of breath [Dyspnea on exertion] : not dyspnea during exertion [Chest Discomfort] : no chest discomfort [Leg Claudication] : no intermittent leg claudication [Palpitations] : no palpitations [Orthopnea] : no orthopnea [Syncope] : no syncope [Dizziness] : no dizziness [FreeTextEntry8] : urinary rentention

## 2023-12-29 NOTE — DISCUSSION/SUMMARY
[FreeTextEntry1] : Mr. Velásquez is a 78 year-old male with persistent atrial fibrillation with symptoms despite adequate rate control.  His WEAVER improved following DC cardioversion but he reverted back to AF.   As such, he underwent successful PVI in November 2022 but suffered a recurrence of AF.  He most recently underwent ILR implant and subsequently pulse field ablation at [facility] with Dr. Staples early December 2023.  He has suffered return of pAF.    1.  Rhythm management  ILR interrogation today showing paroxysmal AF starting several days after his procedure.  We reviewed that AF can occur in the blanking period post ablation and does not necessarily reflect on the success of the procedure.  We will continue to monitor his burden via ILR.  - maintain rate control with Toprol XL 25mg QD  2.  Stroke management - Maintain Xarelto 20mg indefinitely  3.  Risk factor management DM - A1c at goal HTN - BP at goal TRISTAN - may need screening in near future.  4. follow up in 4mo.

## 2023-12-29 NOTE — CARDIOLOGY SUMMARY
[LVEF ___%] : LVEF [unfilled]% [___] : [unfilled] [Enlarged] : enlarged LA size [Moderate] : moderate mitral regurgitation [de-identified] : NSR at 64 bpm, normal axis/intervals [de-identified] : -TTE 4/12/2023: normal LV systolif function, EF 59%, grade 3 diastolic dysfunction, mild to moderate MR/TR. [de-identified] : -Exercise stress Echo 6/7/2023: non diagnostic due to inadequate HR and BP however no ischemic changes seen at 82% of maxiumum heart rate acheived. normal LV systolic function.

## 2023-12-29 NOTE — HISTORY OF PRESENT ILLNESS
[FreeTextEntry1] : Mr. Velásquez is a 77 year-old male with hypertension, hyperlipidemia, and persistent atrial fibrillation who presents for follow-up.  He was diagnosed with atrial fibrillation and had been managed with a rate control strategy employing low dose toprol.  Despite adequate rate control, he continued to reports dyspnea upon exertion, reporting SOB after 1/4 mile or a flight of stairs.  He denied palpitations, chest pain, SOB at rest, LE edema, orthopnea, PND, and syncope.  Due to persistent symptoms despite adequate rate control, he underwent DC cardioversion in June 2021.  He reported a dramatic improvement in his symptoms since the cardioversion, with much less WEAVER.  He unfortunately reverted back to AF in August with similar complaints of WEAVER.  He underwent cardioversion again at that time but reverted to AF within a few days.  As such, he underwent successful pulmonary vein isolation in November 2022.  He developed AF during the blanking period and was started on amiodarone with repeat DCCV.  His amiodarone was discontinued due to adverse effects. AF returned and he underwent DCCV 9/2023.  He began following with Dr. Staples at [facility] and underwent ILR implant and subsequently pulse field ablation early December 2023.  He presents for follow up.  He reports initial sinus rhythm post ablation but has felt paroxysms of AF since.

## 2023-12-29 NOTE — ADDENDUM
[FreeTextEntry1] : I, Demetria Max, am scribing for and the presence of Dr. Lee the following sections: HPI, PMH,Family/social history, ROS, Physical Exam, Assessment / Plan.  I, Gene Lee, personally performed the services described in the documentation, reviewed the documentation recorded by the scribe in my presence and it accurately and completely records my words and actions.

## 2023-12-29 NOTE — PROCEDURE
[de-identified] : [employer] [de-identified] : LNQ22 [de-identified] : [id number] [de-identified] : paroxysmal AF noted

## 2024-04-19 ENCOUNTER — APPOINTMENT (OUTPATIENT)
Dept: HEART AND VASCULAR | Facility: CLINIC | Age: 79
End: 2024-04-19
Payer: MEDICARE

## 2024-04-19 DIAGNOSIS — I48.19 OTHER PERSISTENT ATRIAL FIBRILLATION: ICD-10-CM

## 2024-04-19 PROCEDURE — 99213 OFFICE O/P EST LOW 20 MIN: CPT | Mod: 25

## 2024-04-19 PROCEDURE — 93291 INTERROG DEV EVAL SCRMS IP: CPT

## 2024-04-24 PROBLEM — I48.19 PERSISTENT ATRIAL FIBRILLATION: Status: ACTIVE | Noted: 2021-04-19

## 2024-04-24 NOTE — HISTORY OF PRESENT ILLNESS
[FreeTextEntry1] : Mr. Velásquez is a 78 year-old male with hypertension, hyperlipidemia, and persistent atrial fibrillation who presents for follow-up.  He was diagnosed with atrial fibrillation and had been managed with a rate control strategy employing low dose toprol.  Despite adequate rate control, he continued to reports dyspnea upon exertion, reporting SOB after 1/4 mile or a flight of stairs.  He denied palpitations, chest pain, SOB at rest, LE edema, orthopnea, PND, and syncope.  Due to persistent symptoms despite adequate rate control, he underwent DC cardioversion in June 2021.  He reported a dramatic improvement in his symptoms since the cardioversion, with much less WEAVER.  He unfortunately reverted back to AF in August with similar complaints of WEAVER.  He underwent cardioversion again at that time but reverted to AF within a few days.  As such, he underwent successful pulmonary vein isolation in November 2022.  He developed AF during the blanking period and was started on amiodarone with repeat DCCV.  His amiodarone was discontinued due to adverse effects. AF returned and he underwent DCCV 9/2023.  He began following with Dr. Staples at [facility] and underwent ILR implant and subsequently pulse field ablation early December 2023.  He suffered AF during the blanking period post ablation.  He returns for follow up.  ILR monitoring today shows some recurrent AF in march 2024 however episodes are not all clearly AF as some appears to be frequent APCs.  He currently feels well without any significant palpitations.  He is compliant with Xarelto.

## 2024-04-24 NOTE — DISCUSSION/SUMMARY
[FreeTextEntry1] : Mr. Velásquez is a 78 year-old male with persistent atrial fibrillation with symptoms despite adequate rate control.  His WEAVER improved following DC cardioversion but he reverted back to AF.   As such, he underwent successful PVI in November 2022 but suffered a recurrence of AF.  He most recently underwent ILR implant and subsequently pulse field ablation at [facility] with Dr. Staples early December 2023.  He has suffered return of pAF in the blanking period.     1.  Rhythm management  ILR interrogation today showing paroxysmal AF, last noted in March 2024.  These episodes are not all clearly AF and some appear to be APCs.  We again reviewed that AF can occur in the blanking period post ablation and does not necessarily reflect on the success of the procedure.  We will continue to monitor his burden via ILR.  - maintain rate control with Toprol XL 25mg QD  2.  Stroke management - Maintain Xarelto 20mg indefinitely  3.  Risk factor management DM - A1c at goal HTN - BP at goal TRISTAN - may need screening in near future.  4. follow up in 4mo.

## 2024-04-24 NOTE — PHYSICAL EXAM
[Well Developed] : well developed [Well Nourished] : well nourished [No Acute Distress] : no acute distress [Normal Venous Pressure] : normal venous pressure [No Carotid Bruit] : no carotid bruit [Normal S1, S2] : normal S1, S2 [No Murmur] : no murmur [No Rub] : no rub [No Gallop] : no gallop [Clear Lung Fields] : clear lung fields [Good Air Entry] : good air entry [No Respiratory Distress] : no respiratory distress  [Soft] : abdomen soft [Non Tender] : non-tender [No Masses/organomegaly] : no masses/organomegaly [Normal Bowel Sounds] : normal bowel sounds [Normal Gait] : normal gait [No Edema] : no edema [No Cyanosis] : no cyanosis [No Clubbing] : no clubbing [No Varicosities] : no varicosities [No Rash] : no rash [No Skin Lesions] : no skin lesions [Moves all extremities] : moves all extremities [No Focal Deficits] : no focal deficits [Normal Speech] : normal speech [Alert and Oriented] : alert and oriented [Normal memory] : normal memory [General Appearance - Well Developed] : well developed [Normal Appearance] : normal appearance [Well Groomed] : well groomed [General Appearance - Well Nourished] : well nourished [No Deformities] : no deformities [General Appearance - In No Acute Distress] : no acute distress [Heart Rate And Rhythm] : heart rate and rhythm were normal [Heart Sounds] : normal S1 and S2 [Murmurs] : no murmurs present [Respiration, Rhythm And Depth] : normal respiratory rhythm and effort [Exaggerated Use Of Accessory Muscles For Inspiration] : no accessory muscle use [Auscultation Breath Sounds / Voice Sounds] : lungs were clear to auscultation bilaterally [Clean] : clean [Dry] : dry [Well-Healed] : well-healed [Abdomen Soft] : soft [Abdomen Tenderness] : non-tender [Abdomen Mass (___ Cm)] : no abdominal mass palpated [Cyanosis, Localized] : no localized cyanosis [Nail Clubbing] : no clubbing of the fingernails [Petechial Hemorrhages (___cm)] : no petechial hemorrhages [] : no ischemic changes [Normal Conjunctiva] : the conjunctiva exhibited no abnormalities [Eyelids - No Xanthelasma] : the eyelids demonstrated no xanthelasmas [Normal Oral Mucosa] : normal oral mucosa [No Oral Pallor] : no oral pallor [No Oral Cyanosis] : no oral cyanosis [Normal Jugular Venous A Waves Present] : normal jugular venous A waves present [Normal Jugular Venous V Waves Present] : normal jugular venous V waves present [No Jugular Venous Johnson A Waves] : no jugular venous johnson A waves [Abnormal Walk] : normal gait [Gait - Sufficient For Exercise Testing] : the gait was sufficient for exercise testing

## 2024-04-24 NOTE — CARDIOLOGY SUMMARY
[de-identified] : EKG 4/10/24: sinus rhythm 64bpm NSR at 64 bpm, normal axis/intervals [de-identified] : -Exercise stress Echo 6/7/2023: non diagnostic due to inadequate HR and BP however no ischemic changes seen at 82% of maxiumum heart rate acheived. normal LV systolic function. [de-identified] : -TTE 4/12/2023: normal LV systolif function, EF 59%, grade 3 diastolic dysfunction, mild to moderate MR/TR. [LVEF ___%] : LVEF [unfilled]% [___] : [unfilled] [Enlarged] : enlarged LA size [Moderate] : moderate mitral regurgitation

## 2024-04-24 NOTE — PROCEDURE
[de-identified] : [employer] [de-identified] : LNQ22 [de-identified] : [id number] [de-identified] : paroxysmal AF noted in March.  Epsidoes are not all clearly AF and some appear to be frequent APCs.

## 2024-04-24 NOTE — REVIEW OF SYSTEMS
[Feeling Fatigued] : not feeling fatigued [SOB] : no shortness of breath [Dyspnea on exertion] : not dyspnea during exertion [Chest Discomfort] : no chest discomfort [Leg Claudication] : no intermittent leg claudication [Palpitations] : no palpitations [Orthopnea] : no orthopnea [Syncope] : no syncope [Dizziness] : no dizziness [Negative] : Heme/Lymph [FreeTextEntry8] : urinary rentention

## 2024-07-09 ENCOUNTER — NON-APPOINTMENT (OUTPATIENT)
Age: 79
End: 2024-07-09

## 2024-07-09 ENCOUNTER — APPOINTMENT (OUTPATIENT)
Dept: PULMONOLOGY | Facility: CLINIC | Age: 79
End: 2024-07-09
Payer: MEDICARE

## 2024-07-09 VITALS
HEIGHT: 77 IN | DIASTOLIC BLOOD PRESSURE: 96 MMHG | BODY MASS INDEX: 23.62 KG/M2 | OXYGEN SATURATION: 98 % | TEMPERATURE: 98.4 F | HEART RATE: 88 BPM | SYSTOLIC BLOOD PRESSURE: 148 MMHG | WEIGHT: 200 LBS

## 2024-07-09 DIAGNOSIS — I27.20 PULMONARY HYPERTENSION, UNSPECIFIED: ICD-10-CM

## 2024-07-09 DIAGNOSIS — R06.09 OTHER FORMS OF DYSPNEA: ICD-10-CM

## 2024-07-09 DIAGNOSIS — I51.89 OTHER ILL-DEFINED HEART DISEASES: ICD-10-CM

## 2024-07-09 PROCEDURE — 99205 OFFICE O/P NEW HI 60 MIN: CPT

## 2024-07-09 PROCEDURE — G2211 COMPLEX E/M VISIT ADD ON: CPT

## 2024-07-09 RX ORDER — MAGNESIUM OXIDE/MAG AA CHELATE 300 MG
CAPSULE ORAL
Refills: 0 | Status: ACTIVE | COMMUNITY

## 2024-07-09 RX ORDER — DILTIAZEM HYDROCHLORIDE 120 MG/1
120 TABLET, COATED ORAL
Refills: 0 | Status: ACTIVE | COMMUNITY

## 2024-07-09 RX ORDER — QUINIDINE SULFATE 200 MG
TABLET ORAL
Refills: 0 | Status: ACTIVE | COMMUNITY

## 2024-07-10 LAB
ALBUMIN SERPL ELPH-MCNC: 4.5 G/DL
ALP BLD-CCNC: 65 U/L
ALT SERPL-CCNC: 18 U/L
AST SERPL-CCNC: 23 U/L
BILIRUB SERPL-MCNC: 0.6 MG/DL
BUN SERPL-MCNC: 28 MG/DL
C3 SERPL-MCNC: 71 MG/DL
C4 SERPL-MCNC: 15 MG/DL
CALCIUM SERPL-MCNC: 9.7 MG/DL
CHLORIDE SERPL-SCNC: 101 MMOL/L
CO2 SERPL-SCNC: 27 MMOL/L
CREAT SERPL-MCNC: 1.25 MG/DL
CRP SERPL-MCNC: <3 MG/L
EGFR: 59 ML/MIN/1.73M2
ERYTHROCYTE [SEDIMENTATION RATE] IN BLOOD BY WESTERGREN METHOD: 7 MM/HR
HCT VFR BLD CALC: 48.5 %
HGB BLD-MCNC: 15.3 G/DL
MCHC RBC-ENTMCNC: 31 PG
MCHC RBC-ENTMCNC: 31.5 GM/DL
MCV RBC AUTO: 98.4 FL
PLATELET # BLD AUTO: NORMAL K/UL
POTASSIUM SERPL-SCNC: 4.1 MMOL/L
PROT SERPL-MCNC: 6.8 G/DL
RBC # BLD: 4.93 M/UL
RBC # FLD: 13 %
RHEUMATOID FACT SER QL: 12 IU/ML
TSH SERPL-ACNC: 1.32 UIU/ML
URATE SERPL-MCNC: 6.7 MG/DL
WBC # FLD AUTO: 5.36 K/UL

## 2024-07-11 LAB
ANA PAT FLD IF-IMP: ABNORMAL
ANA SER IF-ACNC: ABNORMAL
DSDNA AB SER-ACNC: 1 IU/ML
ENA RNP AB SER IA-ACNC: <0.2 AL
ENA SCL70 IGG SER IA-ACNC: 0.3 AL
HBV CORE IGG+IGM SER QL: NONREACTIVE
HBV SURFACE AB SER QL: NONREACTIVE
HBV SURFACE AG SER QL: NONREACTIVE
HCV AB SER QL: NONREACTIVE
HCV S/CO RATIO: 0.14 S/CO
NT-PROBNP SERPL-MCNC: 1006 PG/ML

## 2024-07-12 ENCOUNTER — NON-APPOINTMENT (OUTPATIENT)
Age: 79
End: 2024-07-12

## 2024-07-15 LAB
CCP AB SER IA-ACNC: <8 UNITS
RF+CCP IGG SER-IMP: NEGATIVE

## 2024-07-16 ENCOUNTER — APPOINTMENT (OUTPATIENT)
Dept: PULMONOLOGY | Facility: CLINIC | Age: 79
End: 2024-07-16

## 2024-07-25 LAB
ANION GAP SERPL CALC-SCNC: 12 MMOL/L
BUN SERPL-MCNC: 27 MG/DL
CALCIUM SERPL-MCNC: 9.3 MG/DL
CHLORIDE SERPL-SCNC: 103 MMOL/L
CO2 SERPL-SCNC: 27 MMOL/L
CREAT SERPL-MCNC: 1.13 MG/DL
EGFR: 67 ML/MIN/1.73M2
GLUCOSE SERPL-MCNC: 131 MG/DL
INR PPP: 1.21 RATIO
POTASSIUM SERPL-SCNC: 3.9 MMOL/L
PT BLD: 13.6 SEC
SODIUM SERPL-SCNC: 141 MMOL/L

## 2024-07-29 LAB
HCT VFR BLD CALC: 45.3 %
HGB BLD-MCNC: 15 G/DL
MCHC RBC-ENTMCNC: 31.3 PG
MCHC RBC-ENTMCNC: 33.1 GM/DL
MCV RBC AUTO: 94.4 FL
PLATELET # BLD AUTO: NORMAL K/UL
RBC # BLD: 4.8 M/UL
RBC # FLD: 12.9 %
WBC # FLD AUTO: 5.45 K/UL

## 2024-09-10 ENCOUNTER — APPOINTMENT (OUTPATIENT)
Dept: HEART AND VASCULAR | Facility: CLINIC | Age: 79
End: 2024-09-10
Payer: MEDICARE

## 2024-09-10 ENCOUNTER — NON-APPOINTMENT (OUTPATIENT)
Age: 79
End: 2024-09-10

## 2024-09-10 ENCOUNTER — LABORATORY RESULT (OUTPATIENT)
Age: 79
End: 2024-09-10

## 2024-09-10 VITALS
DIASTOLIC BLOOD PRESSURE: 70 MMHG | SYSTOLIC BLOOD PRESSURE: 140 MMHG | HEIGHT: 77 IN | BODY MASS INDEX: 23.02 KG/M2 | OXYGEN SATURATION: 98 % | TEMPERATURE: 98.7 F | HEART RATE: 73 BPM | WEIGHT: 195 LBS

## 2024-09-10 DIAGNOSIS — I48.92 UNSPECIFIED ATRIAL FLUTTER: ICD-10-CM

## 2024-09-10 DIAGNOSIS — I48.19 OTHER PERSISTENT ATRIAL FIBRILLATION: ICD-10-CM

## 2024-09-10 PROCEDURE — G2211 COMPLEX E/M VISIT ADD ON: CPT

## 2024-09-10 PROCEDURE — 99215 OFFICE O/P EST HI 40 MIN: CPT

## 2024-09-10 PROCEDURE — 93000 ELECTROCARDIOGRAM COMPLETE: CPT

## 2024-09-11 ENCOUNTER — RX RENEWAL (OUTPATIENT)
Age: 79
End: 2024-09-11

## 2024-09-11 RX ORDER — FLECAINIDE ACETATE 50 MG/1
50 TABLET ORAL
Qty: 180 | Refills: 0 | Status: ACTIVE | COMMUNITY
Start: 2024-09-10 | End: 1900-01-01

## 2024-09-13 PROBLEM — I48.92 ATRIAL FLUTTER: Status: ACTIVE | Noted: 2024-09-13

## 2024-09-18 NOTE — HISTORY OF PRESENT ILLNESS
[FreeTextEntry1] : Mr. Velásquez is a 79 year-old male with hypertension, hyperlipidemia, and persistent atrial fibrillation who presents for follow-up.  He was diagnosed with atrial fibrillation and had been managed with a rate control strategy employing low dose toprol.  Despite adequate rate control, he continued to report WEAVER. Due to persistent symptoms despite adequate rate control, he underwent DC cardioversion in June 2021.  He reported a dramatic improvement in his symptoms since the cardioversion, with much less WEAVER.  He unfortunately reverted back to AF in August with similar complaints of WEAVER.  He underwent cardioversion again at that time but reverted to AF within a few days.  As such, he underwent successful pulmonary vein isolation in November 2022.  He developed AF during the blanking period and was started on amiodarone with repeat DCCV.  His amiodarone was discontinued due to adverse effects. AF returned and he underwent DCCV 9/2023.  He began following with Dr. Staples at [facility] and underwent ILR implant and subsequently pulse field ablation early December 2023.  He suffered AF during the blanking period post ablation.  He was found to be in atrial flutter in July 2024 and underwent DCCV 7/30/24 at [facility] and subsequent redo ablation with Dr. Staples at [facility] 8/19/24 (Full report scanned into [employer]).  He recently followed with his cardiologist Dr. Kent where his EKG was significant for recurrent slow atrial flutter.  He presents for follow up today.  He is feeling okay without any significant palpitations or SOB.  He was aware of a higher HR days after his procedure.  ILR interrogated today showing Aflutter recurred soon after ablation.  He is compliant with Xarelto.   EKG 9/10/24: slow atrial flutter.  HR 113bpm, PVCs.

## 2024-09-18 NOTE — ADDENDUM
[FreeTextEntry1] : I, Demetria Max, am scribing for and the presence of Dr. Belcher the following sections: HPI, PMH,Family/social history, ROS, Physical Exam, Assessment / Plan. I, Jim Belcher, personally performed the services described in the documentation, reviewed the documentation recorded by the scribe in my presence and it accurately and completely records my words and actions.

## 2024-09-18 NOTE — PROCEDURE
[de-identified] : [employer] [de-identified] : LNQ22 [de-identified] : [id number] [de-identified] : persistent atrial flutter which occurred soon after recent ablation.

## 2024-09-18 NOTE — CARDIOLOGY SUMMARY
[LVEF ___%] : LVEF [unfilled]% [___] : [unfilled] [Enlarged] : enlarged LA size [Moderate] : moderate mitral regurgitation [de-identified] : EKG 4/10/24: sinus rhythm 64bpm NSR at 64 bpm, normal axis/intervals [de-identified] : -Exercise stress Echo 6/7/2023: non diagnostic due to inadequate HR and BP however no ischemic changes seen at 82% of maxiumum heart rate acheived. normal LV systolic function. [de-identified] : - TTE 5/22/24: EF 65%, no WMA, moderate MR, PASP 58mmHg.  -TTE 4/12/2023: normal LV systolif function, EF 59%, grade 3 diastolic dysfunction, mild to moderate MR/TR. [de-identified] : - CCTA 11/2022: calcium score 595.  non ob CAD

## 2024-09-18 NOTE — PROCEDURE
[de-identified] : [employer] [de-identified] : LNQ22 [de-identified] : [id number] [de-identified] : persistent atrial flutter which occurred soon after recent ablation.

## 2024-09-18 NOTE — CARDIOLOGY SUMMARY
[LVEF ___%] : LVEF [unfilled]% [___] : [unfilled] [Enlarged] : enlarged LA size [Moderate] : moderate mitral regurgitation [de-identified] : EKG 4/10/24: sinus rhythm 64bpm NSR at 64 bpm, normal axis/intervals [de-identified] : -Exercise stress Echo 6/7/2023: non diagnostic due to inadequate HR and BP however no ischemic changes seen at 82% of maxiumum heart rate acheived. normal LV systolic function. [de-identified] : - TTE 5/22/24: EF 65%, no WMA, moderate MR, PASP 58mmHg.  -TTE 4/12/2023: normal LV systolif function, EF 59%, grade 3 diastolic dysfunction, mild to moderate MR/TR. [de-identified] : - CCTA 11/2022: calcium score 595.  non ob CAD

## 2024-09-18 NOTE — PHYSICAL EXAM
[Well Developed] : well developed [Well Nourished] : well nourished [No Acute Distress] : no acute distress [Normal Venous Pressure] : normal venous pressure [No Carotid Bruit] : no carotid bruit [Normal S1, S2] : normal S1, S2 [No Murmur] : no murmur [No Rub] : no rub [No Gallop] : no gallop [Clear Lung Fields] : clear lung fields [Good Air Entry] : good air entry [No Respiratory Distress] : no respiratory distress  [Soft] : abdomen soft [Non Tender] : non-tender [No Masses/organomegaly] : no masses/organomegaly [Normal Bowel Sounds] : normal bowel sounds [Normal Gait] : normal gait [No Edema] : no edema [No Cyanosis] : no cyanosis [No Clubbing] : no clubbing [No Varicosities] : no varicosities [No Rash] : no rash [No Skin Lesions] : no skin lesions [Moves all extremities] : moves all extremities [No Focal Deficits] : no focal deficits [Normal Speech] : normal speech [Alert and Oriented] : alert and oriented [Normal memory] : normal memory [General Appearance - Well Developed] : well developed [Normal Appearance] : normal appearance [Well Groomed] : well groomed [General Appearance - Well Nourished] : well nourished [No Deformities] : no deformities [General Appearance - In No Acute Distress] : no acute distress [Heart Sounds] : normal S1 and S2 [Murmurs] : no murmurs present [Respiration, Rhythm And Depth] : normal respiratory rhythm and effort [Exaggerated Use Of Accessory Muscles For Inspiration] : no accessory muscle use [Auscultation Breath Sounds / Voice Sounds] : lungs were clear to auscultation bilaterally [Clean] : clean [Dry] : dry [Well-Healed] : well-healed [Abdomen Soft] : soft [Abdomen Tenderness] : non-tender [Abdomen Mass (___ Cm)] : no abdominal mass palpated [Nail Clubbing] : no clubbing of the fingernails [Cyanosis, Localized] : no localized cyanosis [Petechial Hemorrhages (___cm)] : no petechial hemorrhages [] : no ischemic changes [Normal Conjunctiva] : the conjunctiva exhibited no abnormalities [Eyelids - No Xanthelasma] : the eyelids demonstrated no xanthelasmas [Normal Oral Mucosa] : normal oral mucosa [No Oral Pallor] : no oral pallor [No Oral Cyanosis] : no oral cyanosis [Normal Jugular Venous A Waves Present] : normal jugular venous A waves present [Normal Jugular Venous V Waves Present] : normal jugular venous V waves present [No Jugular Venous Johnson A Waves] : no jugular venous johnson A waves [Abnormal Walk] : normal gait [Gait - Sufficient For Exercise Testing] : the gait was sufficient for exercise testing [FreeTextEntry1] : tachycardic.

## 2024-09-18 NOTE — HISTORY OF PRESENT ILLNESS
[FreeTextEntry1] : Mr. Velásquez is a 79 year-old male with hypertension, hyperlipidemia, and persistent atrial fibrillation who presents for follow-up.  He was diagnosed with atrial fibrillation and had been managed with a rate control strategy employing low dose toprol.  Despite adequate rate control, he continued to report WEAVER. Due to persistent symptoms despite adequate rate control, he underwent DC cardioversion in June 2021.  He reported a dramatic improvement in his symptoms since the cardioversion, with much less WEAVER.  He unfortunately reverted back to AF in August with similar complaints of WEAVER.  He underwent cardioversion again at that time but reverted to AF within a few days.  As such, he underwent successful pulmonary vein isolation in November 2022.  He developed AF during the blanking period and was started on amiodarone with repeat DCCV.  His amiodarone was discontinued due to adverse effects. AF returned and he underwent DCCV 9/2023.  He began following with Dr. Staples at [facility] and underwent ILR implant and subsequently pulse field ablation early December 2023.  He suffered AF during the blanking period post ablation.  He was found to be in atrial flutter in July 2024 and underwent DCCV 7/30/24 at [facility] and subsequent redo ablation with Dr. Staples at [facility] 8/19/24 (Full report scanned into [employer]).  He recently followed with his cardiologist Dr. Kent where his EKG was significant for recurrent slow atrial flutter.  He presents for follow up today.  He is feeling okay without any significant palpitations or SOB.  He was aware of a higher HR days after his procedure.  ILR interrogated today showing Aflutter recurred soon after ablation.  He is compliant with Xarelto.   EKG 9/10/24: slow atrial flutter.  HR 113bpm, PVCs.

## 2024-10-08 ENCOUNTER — TRANSCRIPTION ENCOUNTER (OUTPATIENT)
Age: 79
End: 2024-10-08

## 2024-11-12 ENCOUNTER — APPOINTMENT (OUTPATIENT)
Dept: PULMONOLOGY | Facility: CLINIC | Age: 79
End: 2024-11-12

## 2024-12-10 ENCOUNTER — NON-APPOINTMENT (OUTPATIENT)
Age: 79
End: 2024-12-10

## 2024-12-10 ENCOUNTER — APPOINTMENT (OUTPATIENT)
Dept: PULMONOLOGY | Facility: CLINIC | Age: 79
End: 2024-12-10
Payer: MEDICARE

## 2024-12-10 VITALS
HEART RATE: 61 BPM | DIASTOLIC BLOOD PRESSURE: 74 MMHG | OXYGEN SATURATION: 95 % | WEIGHT: 202 LBS | SYSTOLIC BLOOD PRESSURE: 164 MMHG | HEIGHT: 77 IN | BODY MASS INDEX: 23.85 KG/M2

## 2024-12-10 DIAGNOSIS — I51.89 OTHER ILL-DEFINED HEART DISEASES: ICD-10-CM

## 2024-12-10 DIAGNOSIS — R06.09 OTHER FORMS OF DYSPNEA: ICD-10-CM

## 2024-12-10 DIAGNOSIS — I27.20 PULMONARY HYPERTENSION, UNSPECIFIED: ICD-10-CM

## 2024-12-10 PROCEDURE — 99215 OFFICE O/P EST HI 40 MIN: CPT

## 2024-12-11 LAB
ALBUMIN SERPL ELPH-MCNC: 4.5 G/DL
ALP BLD-CCNC: 84 U/L
ALT SERPL-CCNC: 16 U/L
ANION GAP SERPL CALC-SCNC: 18 MMOL/L
AST SERPL-CCNC: 25 U/L
BILIRUB SERPL-MCNC: 0.7 MG/DL
BUN SERPL-MCNC: 30 MG/DL
C3 SERPL-MCNC: 82 MG/DL
C4 SERPL-MCNC: 19 MG/DL
CALCIUM SERPL-MCNC: 9.6 MG/DL
CHLORIDE SERPL-SCNC: 98 MMOL/L
CO2 SERPL-SCNC: 25 MMOL/L
CREAT SERPL-MCNC: 1.09 MG/DL
EGFR: 70 ML/MIN/1.73M2
GLUCOSE SERPL-MCNC: 171 MG/DL
NT-PROBNP SERPL-MCNC: 210 PG/ML
POTASSIUM SERPL-SCNC: 3.7 MMOL/L
PROT SERPL-MCNC: 7.2 G/DL
SODIUM SERPL-SCNC: 141 MMOL/L

## 2024-12-13 LAB — COMPLEMENT, ALTERNATE PATHWAY (AH50): 61

## 2025-03-04 ENCOUNTER — RX RENEWAL (OUTPATIENT)
Age: 80
End: 2025-03-04

## 2025-05-05 ENCOUNTER — RESULT REVIEW (OUTPATIENT)
Age: 80
End: 2025-05-05